# Patient Record
Sex: MALE | Race: WHITE | Employment: OTHER | URBAN - METROPOLITAN AREA
[De-identification: names, ages, dates, MRNs, and addresses within clinical notes are randomized per-mention and may not be internally consistent; named-entity substitution may affect disease eponyms.]

---

## 2019-08-19 ENCOUNTER — HOSPITAL ENCOUNTER (EMERGENCY)
Age: 79
Discharge: HOME | End: 2019-08-19
Payer: MEDICARE

## 2019-08-19 ENCOUNTER — APPOINTMENT (OUTPATIENT)
Dept: CT IMAGING | Age: 79
End: 2019-08-19
Payer: MEDICARE

## 2019-08-19 VITALS
TEMPERATURE: 97 F | SYSTOLIC BLOOD PRESSURE: 137 MMHG | OXYGEN SATURATION: 100 % | HEIGHT: 73 IN | BODY MASS INDEX: 23.52 KG/M2 | HEART RATE: 51 BPM | DIASTOLIC BLOOD PRESSURE: 74 MMHG | WEIGHT: 177.47 LBS | RESPIRATION RATE: 13 BRPM

## 2019-08-19 DIAGNOSIS — R11.2 NON-INTRACTABLE VOMITING WITH NAUSEA, UNSPECIFIED VOMITING TYPE: ICD-10-CM

## 2019-08-19 DIAGNOSIS — R10.13 EPIGASTRIC PAIN: ICD-10-CM

## 2019-08-19 DIAGNOSIS — R19.7 DIARRHEA, UNSPECIFIED TYPE: Primary | ICD-10-CM

## 2019-08-19 LAB
A/G RATIO: 0.8 (ref 1.1–2.2)
ALBUMIN SERPL-MCNC: 3.2 G/DL (ref 3.4–5)
ALP BLD-CCNC: 74 U/L (ref 40–129)
ALT SERPL-CCNC: 42 U/L (ref 10–40)
ANION GAP SERPL CALCULATED.3IONS-SCNC: 13 MMOL/L (ref 3–16)
AST SERPL-CCNC: 39 U/L (ref 15–37)
BASOPHILS ABSOLUTE: 0 K/UL (ref 0–0.2)
BASOPHILS RELATIVE PERCENT: 0.7 %
BILIRUB SERPL-MCNC: 0.3 MG/DL (ref 0–1)
BILIRUBIN URINE: NEGATIVE
BLOOD, URINE: NEGATIVE
BUN BLDV-MCNC: 19 MG/DL (ref 7–20)
CALCIUM SERPL-MCNC: 8.9 MG/DL (ref 8.3–10.6)
CHLORIDE BLD-SCNC: 107 MMOL/L (ref 99–110)
CLARITY: CLEAR
CO2: 21 MMOL/L (ref 21–32)
COLOR: YELLOW
CREAT SERPL-MCNC: 1.4 MG/DL (ref 0.8–1.3)
EKG ATRIAL RATE: 63 BPM
EKG DIAGNOSIS: NORMAL
EKG P AXIS: 12 DEGREES
EKG P-R INTERVAL: 162 MS
EKG Q-T INTERVAL: 462 MS
EKG QRS DURATION: 74 MS
EKG QTC CALCULATION (BAZETT): 472 MS
EKG R AXIS: 33 DEGREES
EKG T AXIS: -10 DEGREES
EKG VENTRICULAR RATE: 63 BPM
EOSINOPHILS ABSOLUTE: 0.1 K/UL (ref 0–0.6)
EOSINOPHILS RELATIVE PERCENT: 1.5 %
GFR AFRICAN AMERICAN: 59
GFR NON-AFRICAN AMERICAN: 49
GLOBULIN: 4.2 G/DL
GLUCOSE BLD-MCNC: 140 MG/DL (ref 70–99)
GLUCOSE URINE: NEGATIVE MG/DL
HCT VFR BLD CALC: 45 % (ref 40.5–52.5)
HEMOGLOBIN: 14.7 G/DL (ref 13.5–17.5)
KETONES, URINE: NEGATIVE MG/DL
LEUKOCYTE ESTERASE, URINE: NEGATIVE
LIPASE: 51 U/L (ref 13–60)
LYMPHOCYTES ABSOLUTE: 1.6 K/UL (ref 1–5.1)
LYMPHOCYTES RELATIVE PERCENT: 24.6 %
MCH RBC QN AUTO: 27.9 PG (ref 26–34)
MCHC RBC AUTO-ENTMCNC: 32.6 G/DL (ref 31–36)
MCV RBC AUTO: 85.4 FL (ref 80–100)
MICROSCOPIC EXAMINATION: NORMAL
MONOCYTES ABSOLUTE: 0.6 K/UL (ref 0–1.3)
MONOCYTES RELATIVE PERCENT: 8.6 %
NEUTROPHILS ABSOLUTE: 4.3 K/UL (ref 1.7–7.7)
NEUTROPHILS RELATIVE PERCENT: 64.6 %
NITRITE, URINE: NEGATIVE
PDW BLD-RTO: 15 % (ref 12.4–15.4)
PH UA: 5 (ref 5–8)
PLATELET # BLD: 384 K/UL (ref 135–450)
PMV BLD AUTO: 8.6 FL (ref 5–10.5)
POTASSIUM REFLEX MAGNESIUM: 4.6 MMOL/L (ref 3.5–5.1)
PROTEIN UA: NEGATIVE MG/DL
RBC # BLD: 5.26 M/UL (ref 4.2–5.9)
SODIUM BLD-SCNC: 141 MMOL/L (ref 136–145)
SPECIFIC GRAVITY UA: 1.02 (ref 1–1.03)
TOTAL PROTEIN: 7.4 G/DL (ref 6.4–8.2)
URINE REFLEX TO CULTURE: NORMAL
URINE TYPE: NORMAL
UROBILINOGEN, URINE: 0.2 E.U./DL
WBC # BLD: 6.6 K/UL (ref 4–11)

## 2019-08-19 PROCEDURE — 6370000000 HC RX 637 (ALT 250 FOR IP): Performed by: PHYSICIAN ASSISTANT

## 2019-08-19 PROCEDURE — 83690 ASSAY OF LIPASE: CPT

## 2019-08-19 PROCEDURE — 99284 EMERGENCY DEPT VISIT MOD MDM: CPT

## 2019-08-19 PROCEDURE — 96361 HYDRATE IV INFUSION ADD-ON: CPT

## 2019-08-19 PROCEDURE — 74176 CT ABD & PELVIS W/O CONTRAST: CPT

## 2019-08-19 PROCEDURE — 2580000003 HC RX 258: Performed by: PHYSICIAN ASSISTANT

## 2019-08-19 PROCEDURE — 93010 ELECTROCARDIOGRAM REPORT: CPT | Performed by: INTERNAL MEDICINE

## 2019-08-19 PROCEDURE — 96374 THER/PROPH/DIAG INJ IV PUSH: CPT

## 2019-08-19 PROCEDURE — 81003 URINALYSIS AUTO W/O SCOPE: CPT

## 2019-08-19 PROCEDURE — 6360000002 HC RX W HCPCS: Performed by: PHYSICIAN ASSISTANT

## 2019-08-19 PROCEDURE — 80053 COMPREHEN METABOLIC PANEL: CPT

## 2019-08-19 PROCEDURE — 85025 COMPLETE CBC W/AUTO DIFF WBC: CPT

## 2019-08-19 PROCEDURE — 93005 ELECTROCARDIOGRAM TRACING: CPT | Performed by: EMERGENCY MEDICINE

## 2019-08-19 RX ORDER — DIPHENOXYLATE HYDROCHLORIDE AND ATROPINE SULFATE 2.5; .025 MG/1; MG/1
1 TABLET ORAL ONCE
Status: COMPLETED | OUTPATIENT
Start: 2019-08-19 | End: 2019-08-19

## 2019-08-19 RX ORDER — ONDANSETRON 4 MG/1
4 TABLET, ORALLY DISINTEGRATING ORAL EVERY 12 HOURS PRN
Qty: 12 TABLET | Refills: 0 | Status: SHIPPED | OUTPATIENT
Start: 2019-08-19 | End: 2020-07-30

## 2019-08-19 RX ORDER — ONDANSETRON 2 MG/ML
4 INJECTION INTRAMUSCULAR; INTRAVENOUS ONCE
Status: COMPLETED | OUTPATIENT
Start: 2019-08-19 | End: 2019-08-19

## 2019-08-19 RX ORDER — DICYCLOMINE HYDROCHLORIDE 10 MG/1
10 CAPSULE ORAL ONCE
Status: COMPLETED | OUTPATIENT
Start: 2019-08-19 | End: 2019-08-19

## 2019-08-19 RX ORDER — 0.9 % SODIUM CHLORIDE 0.9 %
1000 INTRAVENOUS SOLUTION INTRAVENOUS ONCE
Status: COMPLETED | OUTPATIENT
Start: 2019-08-19 | End: 2019-08-19

## 2019-08-19 RX ORDER — DICYCLOMINE HYDROCHLORIDE 10 MG/1
10 CAPSULE ORAL
Qty: 30 CAPSULE | Refills: 0 | Status: SHIPPED | OUTPATIENT
Start: 2019-08-19 | End: 2020-07-30

## 2019-08-19 RX ADMIN — DIPHENOXYLATE HYDROCHLORIDE AND ATROPINE SULFATE 1 TABLET: 2.5; .025 TABLET ORAL at 13:47

## 2019-08-19 RX ADMIN — SODIUM CHLORIDE 1000 ML: 9 INJECTION, SOLUTION INTRAVENOUS at 13:47

## 2019-08-19 RX ADMIN — ONDANSETRON 4 MG: 2 INJECTION INTRAMUSCULAR; INTRAVENOUS at 13:47

## 2019-08-19 RX ADMIN — DICYCLOMINE HYDROCHLORIDE 10 MG: 10 CAPSULE ORAL at 13:48

## 2019-08-19 SDOH — HEALTH STABILITY: MENTAL HEALTH: HOW OFTEN DO YOU HAVE A DRINK CONTAINING ALCOHOL?: NEVER

## 2019-08-19 ASSESSMENT — PAIN DESCRIPTION - PAIN TYPE: TYPE: ACUTE PAIN

## 2019-08-19 ASSESSMENT — PAIN SCALES - GENERAL
PAINLEVEL_OUTOF10: 5
PAINLEVEL_OUTOF10: 0

## 2019-08-19 ASSESSMENT — PAIN DESCRIPTION - DESCRIPTORS: DESCRIPTORS: DISCOMFORT

## 2019-08-19 ASSESSMENT — PAIN DESCRIPTION - LOCATION: LOCATION: ABDOMEN

## 2019-08-19 NOTE — ED PROVIDER NOTES
**EVALUATED BY ADVANCED PRACTICE PROVIDER**        [address] [facility]      Pt Name: Zahira Lee  MBL:[id number]  [place] 1940  Date of evaluation: 8/19/2019  Provider: Mandie Oneal PA-C      Chief Complaint:    Chief Complaint   Patient presents with    Emesis     starting this weekend. Dr Latosha Louie referred pt to ER for possible dehydration. recent convert to SR from AFIB.  Nausea    Diarrhea    Abdominal Pain     \"diseased gullbladder\" needs out but getting afib taken care of first.        Nursing Notes, Past Medical Hx, Past Surgical Hx, Social Hx, Allergies, and Family Hx were all reviewed and agreed with or any disagreements were addressed in the HPI.    HPI:  (Location, Duration, Timing, Severity,Quality, Assoc Sx, Context, Modifying factors)  This is a  [de-identified] y.o. male pain and nausea and vomiting. He states he supposed to get his gallbladder taken out but because of his A. fib they will not do surgery until the correct that. He denies chest pain no weaknesses. Denies fever. No back pain. He does complain of having some diarrhea. No blood in his stool. Says stool is watery. No other complaints. PastMedical/Surgical History:      Diagnosis Date    Atrial fibrillation ([facility]     CAD (coronary artery disease)     Cerebral artery occlusion with cerebral infarction ([facility]     Diabetes mellitus ([facility]     H/O heart artery stent     Hypertension     MI (myocardial infarction) ([facility]      History reviewed. No pertinent surgical history. Medications:  Discharge Medication List as of 8/19/2019  3:22 PM            Review of Systems:  Review of Systems   Constitutional: Negative for chills and fever. HENT: Negative for congestion, facial swelling and sneezing. Eyes: Negative for discharge and redness. Respiratory: Negative for apnea, choking and shortness of breath. Cardiovascular: Negative for chest pain. Gastrointestinal: Positive for abdominal pain, diarrhea, nausea and vomiting. Genitourinary: Negative for dysuria. Musculoskeletal: Negative for back pain, neck pain and neck stiffness. Neurological: Negative for dizziness, tremors, seizures and headaches. All other systems reviewed and are negative. Positives and Pertinent negatives as per HPI. Except as noted above in the ROS, problem specific ROS was completed and is negative. Physical Exam:  Physical Exam   Constitutional: He is oriented to person, place, and time. He appears well-developed and well-nourished. HENT:   Head: Normocephalic and atraumatic. Nose: Nose normal.   Eyes: Right eye exhibits no discharge. Left eye exhibits no discharge. Neck: Normal range of motion. Neck supple. Cardiovascular: Normal rate, regular rhythm and normal heart sounds. Exam reveals no gallop and no friction rub. No murmur heard. Pulmonary/Chest: Effort normal and breath sounds normal. No respiratory distress. He has no wheezes. He has no rales. He exhibits no tenderness. Abdominal: Soft. Bowel sounds are normal. He exhibits no distension and no mass. There is tenderness. There is no rebound and no guarding. Musculoskeletal: Normal range of motion. Neurological: He is alert and oriented to person, place, and time. Skin: Skin is warm and dry. He is not diaphoretic. Psychiatric: He has a normal mood and affect. His behavior is normal.   Nursing note and vitals reviewed.       MEDICAL DECISION MAKING    Vitals:    Vitals:    08/19/19 1339 08/19/19 1401 08/19/19 1432 08/19/19 1439   BP:  129/81 137/74    Pulse: 55 50 (!) 47 51   Resp: 19 20 14 13   Temp:       TempSrc:       SpO2:       Weight:       Height:           LABS:  Labs Reviewed   COMPREHENSIVE METABOLIC PANEL W/ REFLEX TO MG FOR LOW K - Abnormal; Notable for the following components:       Result Value    Glucose 140 (*)     CREATININE 1.4 (*)     GFR Non- 49 (*) List as of 8/19/2019  3:22 PM                 (Please note the MDM and HPI sections of this note were completed with a voice recognition program.  Efforts weremade to edit the dictations but occasionally words are mis-transcribed.)    Electronically signed, Shai Dotson PA-C,          Shai Dotson PA-C  08/25/19 6179

## 2019-08-25 ASSESSMENT — ENCOUNTER SYMPTOMS
BACK PAIN: 0
SHORTNESS OF BREATH: 0
APNEA: 0
VOMITING: 1
EYE REDNESS: 0
FACIAL SWELLING: 0
DIARRHEA: 1
CHOKING: 0
NAUSEA: 1
EYE DISCHARGE: 0
ABDOMINAL PAIN: 1

## 2020-07-30 RX ORDER — AMIODARONE HYDROCHLORIDE 200 MG/1
100 TABLET ORAL NIGHTLY
COMMUNITY
Start: 2019-07-29

## 2020-07-30 RX ORDER — METOPROLOL SUCCINATE 100 MG/1
50 TABLET, EXTENDED RELEASE ORAL DAILY
COMMUNITY
Start: 2019-07-24

## 2020-07-30 RX ORDER — ISOSORBIDE MONONITRATE 30 MG/1
15 TABLET, EXTENDED RELEASE ORAL DAILY
COMMUNITY
Start: 2018-12-21

## 2020-07-30 RX ORDER — ASPIRIN 81 MG/1
81 TABLET, CHEWABLE ORAL DAILY
COMMUNITY

## 2020-07-30 RX ORDER — ATORVASTATIN CALCIUM 40 MG/1
40 TABLET, FILM COATED ORAL NIGHTLY
COMMUNITY

## 2020-07-30 NOTE — PROGRESS NOTES
[address] time____1000________        Surgery time__1100__________    Take the following medications with a sip of water:metoprolol,amiodaraone, imdur    Do not eat or drink anything after 12:00 midnight prior to your surgery. except prep  This includes water chewing gum, mints and ice chips. You may brush your teeth and gargle the morning of your surgery, but do not swallow the water     Please see your family doctor/pediatrician for a history and physical and/or concerning medications. Bring any test results/reports from your physicians office. If you are under the care of a heart doctor or specialist doctor, please be aware that you may be asked to them for clearance    You may be asked to stop blood thinners such as Coumadin, Plavix, Fragmin, Lovenox, etc., or any anti-inflammatories such as:  Aspirin, Ibuprofen, Advil, Naproxen prior to your surgery. We also ask that you stop any OTC medications such as fish oil, vitamin E, glucosamine, garlic, Multivitamins, COQ 10, etc.    We ask that you do not smoke 24 hours prior to surgery  We ask that you do not  drink any alcoholic beverages 24 hours prior to surgery     You must make arrangements for a responsible adult to take you home after your surgery. For your safety you will not be allowed to leave alone or drive yourself home. Your surgery will be cancelled if you do not have a ride home. Also for your safety, it is strongly suggested that someone stay with you the first 24 hours after your surgery. A parent or legal guardian must accompany a child scheduled for surgery and plan to stay at the hospital until the child is discharged. Please do not bring other children with you. For your comfort, please wear simple loose fitting clothing to the hospital.  Please do not bring valuables.     Do not wear any make-up or nail polish on your fingers or toes      For your safety, please do not wear any jewelry or body piercing's on the day of surgery. All jewelry must be removed. If you have dentures, they will be removed before going to operating room. For your convenience, we will provide you with a container. If you wear contact lenses or glasses, they will be removed, please bring a case for them. If you have a living will and a durable power of  for healthcare, please bring in a copy. As part of our patient safety program to minimize surgical site infections, we ask you to do the following:    · Please notify your surgeon if you develop any illness between         now and the  day of your surgery. · This includes a cough, cold, fever, sore throat, nausea,         or vomiting, and diarrhea, etc.  ·  Please notify your surgeon if you experience dizziness, shortness         of breath or blurred vision between now and the time of your surgery. You may shower the night before surgery or the morning of   your surgery with an antibacterial soap. You will need to bring a photo ID and insurance card    [facility] has an onsite pharmacy, would you like to utilize our pharmacy     If you will be staying overnight and use a C-pap machine, please bring   your C-pap to hospital     Our goal is to provide you with excellent care, therefore, visitors will be limited to two(2) in the room at a time so that we may focus on providing this care for you. Please contact pre-admission testing if you have any further questions. [facility] phone number:  [phone]  Please note these are generalized instructions for all surgical cases, you may be provided with more specific instructions according to your surgery.

## 2020-08-03 ENCOUNTER — OFFICE VISIT (OUTPATIENT)
Dept: PRIMARY CARE CLINIC | Age: 80
End: 2020-08-03

## 2020-08-03 NOTE — PROGRESS NOTES
Patient presented to [facility] drive up clinic for preop testing. Patient was swabbed and given information advising them to remain isolated until procedure date.

## 2020-08-04 LAB — SARS-COV-2, NAA: NOT DETECTED

## 2020-08-06 ENCOUNTER — ANESTHESIA EVENT (OUTPATIENT)
Dept: ENDOSCOPY | Age: 80
End: 2020-08-06
Payer: MEDICARE

## 2020-08-07 ENCOUNTER — ANESTHESIA (OUTPATIENT)
Dept: ENDOSCOPY | Age: 80
End: 2020-08-07
Payer: MEDICARE

## 2020-08-07 ENCOUNTER — HOSPITAL ENCOUNTER (OUTPATIENT)
Age: 80
Setting detail: OUTPATIENT SURGERY
Discharge: HOME | End: 2020-08-07
Attending: INTERNAL MEDICINE | Admitting: INTERNAL MEDICINE
Payer: MEDICARE

## 2020-08-07 VITALS
TEMPERATURE: 97 F | RESPIRATION RATE: 16 BRPM | BODY MASS INDEX: 26.36 KG/M2 | SYSTOLIC BLOOD PRESSURE: 122 MMHG | OXYGEN SATURATION: 97 % | HEIGHT: 72 IN | HEART RATE: 54 BPM | DIASTOLIC BLOOD PRESSURE: 69 MMHG | WEIGHT: 194.6 LBS

## 2020-08-07 VITALS — OXYGEN SATURATION: 96 % | DIASTOLIC BLOOD PRESSURE: 74 MMHG | SYSTOLIC BLOOD PRESSURE: 117 MMHG

## 2020-08-07 LAB
GLUCOSE BLD-MCNC: 118 MG/DL (ref 70–99)
PERFORMED ON: ABNORMAL

## 2020-08-07 PROCEDURE — 2709999900 HC NON-CHARGEABLE SUPPLY: Performed by: INTERNAL MEDICINE

## 2020-08-07 PROCEDURE — 7100000011 HC PHASE II RECOVERY - ADDTL 15 MIN: Performed by: INTERNAL MEDICINE

## 2020-08-07 PROCEDURE — 7100000010 HC PHASE II RECOVERY - FIRST 15 MIN: Performed by: INTERNAL MEDICINE

## 2020-08-07 PROCEDURE — 6360000002 HC RX W HCPCS: Performed by: NURSE ANESTHETIST, CERTIFIED REGISTERED

## 2020-08-07 PROCEDURE — 2580000003 HC RX 258: Performed by: ANESTHESIOLOGY

## 2020-08-07 PROCEDURE — 3700000000 HC ANESTHESIA ATTENDED CARE: Performed by: INTERNAL MEDICINE

## 2020-08-07 PROCEDURE — 88305 TISSUE EXAM BY PATHOLOGIST: CPT

## 2020-08-07 PROCEDURE — 3609010600 HC COLONOSCOPY POLYPECTOMY SNARE/COLD BIOPSY: Performed by: INTERNAL MEDICINE

## 2020-08-07 PROCEDURE — 2500000003 HC RX 250 WO HCPCS: Performed by: NURSE ANESTHETIST, CERTIFIED REGISTERED

## 2020-08-07 PROCEDURE — 3700000001 HC ADD 15 MINUTES (ANESTHESIA): Performed by: INTERNAL MEDICINE

## 2020-08-07 RX ORDER — SODIUM CHLORIDE 0.9 % (FLUSH) 0.9 %
10 SYRINGE (ML) INJECTION EVERY 12 HOURS SCHEDULED
Status: DISCONTINUED | OUTPATIENT
Start: 2020-08-07 | End: 2020-08-07 | Stop reason: HOSPADM

## 2020-08-07 RX ORDER — SODIUM CHLORIDE 0.9 % (FLUSH) 0.9 %
10 SYRINGE (ML) INJECTION PRN
Status: DISCONTINUED | OUTPATIENT
Start: 2020-08-07 | End: 2020-08-07 | Stop reason: HOSPADM

## 2020-08-07 RX ORDER — SODIUM CHLORIDE 9 MG/ML
INJECTION, SOLUTION INTRAVENOUS CONTINUOUS
Status: DISCONTINUED | OUTPATIENT
Start: 2020-08-07 | End: 2020-08-07 | Stop reason: HOSPADM

## 2020-08-07 RX ORDER — PROPOFOL 10 MG/ML
INJECTION, EMULSION INTRAVENOUS CONTINUOUS PRN
Status: DISCONTINUED | OUTPATIENT
Start: 2020-08-07 | End: 2020-08-07 | Stop reason: SDUPTHER

## 2020-08-07 RX ORDER — PROPOFOL 10 MG/ML
INJECTION, EMULSION INTRAVENOUS PRN
Status: DISCONTINUED | OUTPATIENT
Start: 2020-08-07 | End: 2020-08-07 | Stop reason: SDUPTHER

## 2020-08-07 RX ORDER — ONDANSETRON 2 MG/ML
4 INJECTION INTRAMUSCULAR; INTRAVENOUS
Status: DISCONTINUED | OUTPATIENT
Start: 2020-08-07 | End: 2020-08-07 | Stop reason: HOSPADM

## 2020-08-07 RX ORDER — LIDOCAINE HYDROCHLORIDE 20 MG/ML
INJECTION, SOLUTION EPIDURAL; INFILTRATION; INTRACAUDAL; PERINEURAL PRN
Status: DISCONTINUED | OUTPATIENT
Start: 2020-08-07 | End: 2020-08-07 | Stop reason: SDUPTHER

## 2020-08-07 RX ADMIN — SODIUM CHLORIDE: 9 INJECTION, SOLUTION INTRAVENOUS at 10:27

## 2020-08-07 RX ADMIN — PROPOFOL 180 MCG/KG/MIN: 10 INJECTION, EMULSION INTRAVENOUS at 11:17

## 2020-08-07 RX ADMIN — LIDOCAINE HYDROCHLORIDE 3 ML: 20 INJECTION, SOLUTION EPIDURAL; INFILTRATION; INTRACAUDAL; PERINEURAL at 11:17

## 2020-08-07 RX ADMIN — PROPOFOL 80 MG: 10 INJECTION, EMULSION INTRAVENOUS at 11:17

## 2020-08-07 ASSESSMENT — PAIN SCALES - WONG BAKER
WONGBAKER_NUMERICALRESPONSE: 0

## 2020-08-07 ASSESSMENT — PAIN SCALES - GENERAL
PAINLEVEL_OUTOF10: 0

## 2020-08-07 ASSESSMENT — PAIN - FUNCTIONAL ASSESSMENT: PAIN_FUNCTIONAL_ASSESSMENT: 0-10

## 2020-08-07 ASSESSMENT — ENCOUNTER SYMPTOMS: SHORTNESS OF BREATH: 0

## 2020-08-07 ASSESSMENT — LIFESTYLE VARIABLES: SMOKING_STATUS: 0

## 2020-08-07 NOTE — PROCEDURES
[facility]  Endoscopy Note    Patient: Osmany Hill  : 1940  Acct#: [de-identified]    Procedure: Colonoscopy with polypectomy (snare cautery)    Date:  2020    Surgeon:  Denice Shipman MD    Referring Physician:  Jenn Julian    Previous Colonoscopy: Yes  Date: unknown  Greater than 3 years? Yes    Preoperative Diagnosis:  surveillance    Postoperative Diagnosis:  Colon polyp    Anesthesia:  See anesthesia note    Indications: This is a 78y.o. year old male who presents today with previous adenomatous polyp. Procedure: An informed consent was obtained from the patient after explanation of indications, benefits, possible risks and complications of the procedure. The patient was then taken to the endoscopy suite, placed in the left lateral decubitus position, and the above IV anesthesia was administered. A digital rectal examination was performed and revealed negative without mass, lesions or tenderness. The Olympus CFQ-180-AL video colonoscope was placed in the patient's rectum under digital direction and advanced to the cecum. The cecum was identified by characteristic anatomy and ballottment. The ileocecal valve was identified. The preparation was excellent. The scope was then withdrawn back through the cecum, ascending, transverse, descending and sigmoid colons. Carefull circumferential examination of the mucosa in these areas demonstrated a 5 mm polyp in the ascending colon that was snared and removed. There was a tattoo in the rectum at the previous polypectomy site. The scope was then withdrawn into the rectum and retroflexed. The retroflexed view of the anal verge and rectum demonstrates no abnormalities. The scope was straightened, the colon was decompressed and the scope was withdrawn from the patient. The patient tolerated the procedure well and was taken to the PACU in good condition.     Estimated Blood Loss:  none    Impression: Colon polyp    Recommendations:

## 2020-08-07 NOTE — H&P
[facility]   Pre-operative History and Physical    Patient: Percy Soni  : 1940  Acct#: [de-identified]    History Obtained From: electronic medical record    HISTORY OF PRESENT ILLNESS  Procedure:Colonoscopy  Indications:surveillance  Past Medical History:        Diagnosis Date    [facility] ([facility]     CAD (coronary artery disease)     Cerebral artery occlusion with cerebral infarction ([facility]     Diabetes mellitus ([facility]     H/O heart artery stent     Hypertension     MI (myocardial infarction) ([facility]      Past Surgical History:        Procedure Laterality Date    CARDIAC SURGERY      COLONOSCOPY       Medications prior to admission:   Prior to Admission medications    Medication Sig Start Date End Date Taking?  Authorizing Provider   amiodarone (CORDARONE) 200 MG tablet Take 200 mg by mouth 19  Yes Historical Provider, MD   apixaban (ELIQUIS) 5 MG TABS tablet Take 5 mg by mouth 19  Yes Historical Provider, MD   aspirin 81 MG chewable tablet 81 mg   Yes Historical Provider, MD   atorvastatin (LIPITOR) 40 MG tablet Take 40 mg by mouth   Yes Historical Provider, MD   isosorbide mononitrate (IMDUR) 30 MG extended release tablet TAKE ONE-HALF TABLET BY MOUTH DAILY 18  Yes Historical Provider, MD   metFORMIN (GLUCOPHAGE) 500 MG tablet Take 500 mg by mouth 12/11/15  Yes Historical Provider, MD   metoprolol succinate (TOPROL XL) 100 MG extended release tablet Take 100 mg by mouth 19  Yes Historical Provider, MD     Allergies:   Diphenhydramine    Social History     Socioeconomic History    Marital status:      Spouse name: Not on file    Number of children: Not on file    Years of education: Not on file    Highest education level: Not on file   Occupational History    Not on file   Social Needs    Financial resource strain: Not on file    Food insecurity     Worry: Not on file     Inability: Not on file    Transportation needs     Medical: Not on file

## 2020-08-07 NOTE — ANESTHESIA POSTPROCEDURE EVALUATION
Department of Anesthesiology  Postprocedure Note    Patient: Pete Green  MRN: [id number]  YOB: 1940  Date of evaluation: 8/7/2020  Time:  11:57 AM     Procedure Summary     Date:  08/07/20 Room / Location:  [address] [place] [address]    Anesthesia Start:  1112 Anesthesia Stop:  1470    Procedure:  COLONOSCOPY POLYPECTOMY SNARE/COLD BIOPSY (N/A ) Diagnosis:       History of colon polyps      (HISTORY OF POLYPS)    Surgeon:  Maira Castro MD Responsible Provider:  Lino Logan MD    Anesthesia Type:  MAC ASA Status:  3          Anesthesia Type: MAC    Alejandrina Phase I: Alejandrina Score: 10    Alejandrina Phase II: Alejandrina Score: 10    Last vitals: Reviewed and per EMR flowsheets.        Anesthesia Post Evaluation    Patient location during evaluation: PACU  Patient participation: complete - patient participated  Level of consciousness: awake and alert  Airway patency: patent  Nausea & Vomiting: no nausea and no vomiting  Complications: no  Cardiovascular status: hemodynamically stable  Respiratory status: acceptable  Hydration status: stable

## 2020-08-07 NOTE — ANESTHESIA PRE PROCEDURE
Department of Anesthesiology  Preprocedure Note       Name:  Venecia Sim   Age:  78 y.o.  :  1940                                          MRN:  [id number]         Date:  2020      Surgeon: Flor Dill):  Conrado Infante MD    Procedure: Procedure(s):  COLONOSCOPY DIAGNOSTIC    Medications prior to admission:   Prior to Admission medications    Medication Sig Start Date End Date Taking? Authorizing Provider   amiodarone (CORDARONE) 200 MG tablet Take 200 mg by mouth 19  Yes Historical Provider, MD   apixaban (ELIQUIS) 5 MG TABS tablet Take 5 mg by mouth 19  Yes Historical Provider, MD   aspirin 81 MG chewable tablet 81 mg   Yes Historical Provider, MD   atorvastatin (LIPITOR) 40 MG tablet Take 40 mg by mouth   Yes Historical Provider, MD   isosorbide mononitrate (IMDUR) 30 MG extended release tablet TAKE ONE-HALF TABLET BY MOUTH DAILY 18  Yes Historical Provider, MD   metFORMIN (GLUCOPHAGE) 500 MG tablet Take 500 mg by mouth 12/11/15  Yes Historical Provider, MD   metoprolol succinate (TOPROL XL) 100 MG extended release tablet Take 100 mg by mouth 19  Yes Historical Provider, MD       Current medications:    Current Facility-Administered Medications   Medication Dose Route Frequency Provider Last Rate Last Dose    0.9 % sodium chloride infusion   Intravenous Continuous Lien Linda  mL/hr at 20 1027      sodium chloride flush 0.9 % injection 10 mL  10 mL Intravenous 2 times per day Lien Linda MD        sodium chloride flush 0.9 % injection 10 mL  10 mL Intravenous PRN Lien Linda MD           Allergies: Allergies   Allergen Reactions    Diphenhydramine Palpitations       Problem List:  There is no problem list on file for this patient.       Past Medical History:        Diagnosis Date    Atrial fibrillation ([address]     CAD (coronary artery disease)     [facility]     Diabetes mellitus ([facility]     H/O heart artery stent     Hypertension     MI (myocardial infarction) ([facility]        Past Surgical History:        Procedure Laterality Date    CARDIAC SURGERY      COLONOSCOPY         Social History:    Social History     Tobacco Use    Smoking status: Never Smoker    Smokeless tobacco: Never Used   Substance Use Topics    Alcohol use: Never     Frequency: Never                                Counseling given: Not Answered      Vital Signs (Current):   Vitals:    07/30/20 1406 08/07/20 1024   BP:  (!) 167/91   Pulse:  69   Resp:  16   Temp:  96.4 °F (35.8 °C)   TempSrc:  Temporal   SpO2:  98%   Weight: 190 lb (86.2 kg) 194 lb 9.6 oz (88.3 kg)   Height: 6' (1.829 m) 6' (1.829 m)                                              BP Readings from Last 3 Encounters:   08/07/20 (!) 167/91   08/19/19 137/74       NPO Status: Time of last liquid consumption: 0800                        Time of last solid consumption: 1900                        Date of last liquid consumption: 08/07/20                        Date of last solid food consumption: 08/05/20    BMI:   Wt Readings from Last 3 Encounters:   08/07/20 194 lb 9.6 oz (88.3 kg)   08/19/19 177 lb 7.5 oz (80.5 kg)     Body mass index is 26.39 kg/m².     CBC:   Lab Results   Component Value Date    WBC 6.6 08/19/2019    RBC 5.26 08/19/2019    HGB 14.7 08/19/2019    HCT 45.0 08/19/2019    MCV 85.4 08/19/2019    RDW 15.0 08/19/2019     08/19/2019       CMP:   Lab Results   Component Value Date     08/19/2019    K 4.6 08/19/2019     08/19/2019    CO2 21 08/19/2019    BUN 19 08/19/2019    CREATININE 1.4 08/19/2019    GFRAA 59 08/19/2019    GFRAA >60 03/06/2012    AGRATIO 0.8 08/19/2019    LABGLOM 49 08/19/2019    GLUCOSE 140 08/19/2019    PROT 7.4 08/19/2019    PROT 7.9 03/06/2012    CALCIUM 8.9 08/19/2019    BILITOT 0.3 08/19/2019    ALKPHOS 74 08/19/2019    AST 39 08/19/2019    ALT 42 08/19/2019       POC Tests: No results for input(s): POCGLU, POCNA, POCK, POCCL, Ene Ruiz, POCHCT in the last 72 hours. Coags: No results found for: PROTIME, INR, APTT    HCG (If Applicable): No results found for: PREGTESTUR, PREGSERUM, HCG, HCGQUANT     ABGs: No results found for: PHART, PO2ART, EBX9HUF, EHS3NAN, BEART, U7EPDZVN     Type & Screen (If Applicable):  No results found for: LABABO, LABRH    Drug/Infectious Status (If Applicable):  No results found for: HIV, HEPCAB    COVID-19 Screening (If Applicable):   Lab Results   Component Value Date    COVID19 NOT DETECTED 08/03/2020         Anesthesia Evaluation  Patient summary reviewed no history of anesthetic complications:   Airway: Mallampati: II  TM distance: >3 FB   Neck ROM: full  Mouth opening: > = 3 FB Dental:      Comment: Missing tooth    Pulmonary: breath sounds clear to auscultation      (-) COPD, asthma, shortness of breath, recent URI, sleep apnea and not a current smoker                           Cardiovascular:    (+) hypertension:, past MI:, CAD:, CABG/stent (stents):, dysrhythmias: atrial fibrillation, hyperlipidemia        Rhythm: regular  Rate: normal                    Neuro/Psych:   (+) CVA:,    (-) seizures, neuromuscular disease and TIA           GI/Hepatic/Renal:   (+) GERD: well controlled, bowel prep,      (-) PUD, hepatitis and liver disease       Endo/Other:    (+) DiabetesType II DM, , blood dyscrasia::., .    (-) hypothyroidism               Abdominal:           Vascular:                                      Anesthesia Plan      MAC     ASA 3       Induction: intravenous. Anesthetic plan and risks discussed with patient. Plan discussed with CRNA. This pre-anesthesia assessment may be used as a history and physical.    DOS STAFF ADDENDUM:    Pt seen and examined, chart reviewed (including anesthesia, drug and allergy history). No interval changes to history and physical examination. Anesthetic plan, risks, benefits, alternatives, and personnel involved discussed with patient. Patient verbalized an understanding and agrees to proceed.       Miranda Bonner MD  August 7, 2020  10:29 AM      Miranda Bonner MD   8/7/2020

## 2020-08-07 NOTE — PROGRESS NOTES
Discharge instructions discussed. Patient and responsible adult verbalized understanding of discharge instructions, sedation medication, and potential complications including pain. Patient instructed to call Doctor if complications occur.

## 2021-09-09 ENCOUNTER — HOSPITAL ENCOUNTER (INPATIENT)
Age: 81
LOS: 3 days | Discharge: HOME | DRG: 315 | End: 2021-09-12
Attending: EMERGENCY MEDICINE | Admitting: INTERNAL MEDICINE
Payer: MEDICARE

## 2021-09-09 ENCOUNTER — APPOINTMENT (OUTPATIENT)
Dept: CT IMAGING | Age: 81
DRG: 315 | End: 2021-09-09
Payer: MEDICARE

## 2021-09-09 ENCOUNTER — APPOINTMENT (OUTPATIENT)
Dept: GENERAL RADIOLOGY | Age: 81
DRG: 315 | End: 2021-09-09
Payer: MEDICARE

## 2021-09-09 DIAGNOSIS — Z23 NEED FOR TETANUS BOOSTER: ICD-10-CM

## 2021-09-09 DIAGNOSIS — S61.412A SKIN TEAR OF LEFT HAND WITHOUT COMPLICATION, INITIAL ENCOUNTER: ICD-10-CM

## 2021-09-09 DIAGNOSIS — R55 SYNCOPE AND COLLAPSE: Primary | ICD-10-CM

## 2021-09-09 DIAGNOSIS — S01.81XA FOREHEAD LACERATION, INITIAL ENCOUNTER: ICD-10-CM

## 2021-09-09 PROBLEM — E11.9 DMII (DIABETES MELLITUS, TYPE 2) (HCC): Status: ACTIVE | Noted: 2021-09-09

## 2021-09-09 PROBLEM — E78.5 HYPERLIPIDEMIA: Status: ACTIVE | Noted: 2021-09-09

## 2021-09-09 PROBLEM — I10 ESSENTIAL HYPERTENSION: Status: ACTIVE | Noted: 2021-09-09

## 2021-09-09 LAB
ANION GAP SERPL CALCULATED.3IONS-SCNC: 11 MMOL/L (ref 3–16)
BASOPHILS ABSOLUTE: 0.1 K/UL (ref 0–0.2)
BASOPHILS RELATIVE PERCENT: 1 %
BILIRUBIN URINE: NEGATIVE
BLOOD, URINE: NEGATIVE
BUN BLDV-MCNC: 16 MG/DL (ref 7–20)
CALCIUM SERPL-MCNC: 9 MG/DL (ref 8.3–10.6)
CHLORIDE BLD-SCNC: 102 MMOL/L (ref 99–110)
CLARITY: CLEAR
CO2: 22 MMOL/L (ref 21–32)
COLOR: YELLOW
CREAT SERPL-MCNC: 1.5 MG/DL (ref 0.8–1.3)
EKG ATRIAL RATE: 56 BPM
EKG DIAGNOSIS: NORMAL
EKG P AXIS: 42 DEGREES
EKG P-R INTERVAL: 164 MS
EKG Q-T INTERVAL: 470 MS
EKG QRS DURATION: 76 MS
EKG QTC CALCULATION (BAZETT): 453 MS
EKG R AXIS: 28 DEGREES
EKG T AXIS: 47 DEGREES
EKG VENTRICULAR RATE: 56 BPM
EOSINOPHILS ABSOLUTE: 0.1 K/UL (ref 0–0.6)
EOSINOPHILS RELATIVE PERCENT: 0.8 %
GFR AFRICAN AMERICAN: 54
GFR NON-AFRICAN AMERICAN: 45
GLUCOSE BLD-MCNC: 168 MG/DL (ref 70–99)
GLUCOSE BLD-MCNC: 99 MG/DL (ref 70–99)
GLUCOSE URINE: 250 MG/DL
HCT VFR BLD CALC: 41.8 % (ref 40.5–52.5)
HEMOGLOBIN: 14.2 G/DL (ref 13.5–17.5)
KETONES, URINE: NEGATIVE MG/DL
LACTIC ACID: 2.2 MMOL/L (ref 0.4–2)
LEUKOCYTE ESTERASE, URINE: NEGATIVE
LYMPHOCYTES ABSOLUTE: 1.4 K/UL (ref 1–5.1)
LYMPHOCYTES RELATIVE PERCENT: 17.3 %
MCH RBC QN AUTO: 31 PG (ref 26–34)
MCHC RBC AUTO-ENTMCNC: 34 G/DL (ref 31–36)
MCV RBC AUTO: 91.1 FL (ref 80–100)
MICROSCOPIC EXAMINATION: ABNORMAL
MONOCYTES ABSOLUTE: 1.1 K/UL (ref 0–1.3)
MONOCYTES RELATIVE PERCENT: 13.7 %
NEUTROPHILS ABSOLUTE: 5.5 K/UL (ref 1.7–7.7)
NEUTROPHILS RELATIVE PERCENT: 67.2 %
NITRITE, URINE: NEGATIVE
PDW BLD-RTO: 14.1 % (ref 12.4–15.4)
PERFORMED ON: NORMAL
PH UA: 6 (ref 5–8)
PLATELET # BLD: 198 K/UL (ref 135–450)
PMV BLD AUTO: 9.6 FL (ref 5–10.5)
POTASSIUM REFLEX MAGNESIUM: 4.3 MMOL/L (ref 3.5–5.1)
PROTEIN UA: NEGATIVE MG/DL
RBC # BLD: 4.59 M/UL (ref 4.2–5.9)
SODIUM BLD-SCNC: 135 MMOL/L (ref 136–145)
SPECIFIC GRAVITY UA: 1.02 (ref 1–1.03)
TROPONIN: <0.01 NG/ML
TSH REFLEX: 2.74 UIU/ML (ref 0.27–4.2)
URINE REFLEX TO CULTURE: ABNORMAL
URINE TYPE: ABNORMAL
UROBILINOGEN, URINE: 0.2 E.U./DL
WBC # BLD: 8.2 K/UL (ref 4–11)

## 2021-09-09 PROCEDURE — 2580000003 HC RX 258: Performed by: INTERNAL MEDICINE

## 2021-09-09 PROCEDURE — 90471 IMMUNIZATION ADMIN: CPT | Performed by: NURSE PRACTITIONER

## 2021-09-09 PROCEDURE — 6360000002 HC RX W HCPCS: Performed by: NURSE PRACTITIONER

## 2021-09-09 PROCEDURE — 72125 CT NECK SPINE W/O DYE: CPT

## 2021-09-09 PROCEDURE — 81003 URINALYSIS AUTO W/O SCOPE: CPT

## 2021-09-09 PROCEDURE — 94640 AIRWAY INHALATION TREATMENT: CPT

## 2021-09-09 PROCEDURE — U0005 INFEC AGEN DETEC AMPLI PROBE: HCPCS

## 2021-09-09 PROCEDURE — U0003 INFECTIOUS AGENT DETECTION BY NUCLEIC ACID (DNA OR RNA); SEVERE ACUTE RESPIRATORY SYNDROME CORONAVIRUS 2 (SARS-COV-2) (CORONAVIRUS DISEASE [COVID-19]), AMPLIFIED PROBE TECHNIQUE, MAKING USE OF HIGH THROUGHPUT TECHNOLOGIES AS DESCRIBED BY CMS-2020-01-R: HCPCS

## 2021-09-09 PROCEDURE — 6370000000 HC RX 637 (ALT 250 FOR IP): Performed by: NURSE PRACTITIONER

## 2021-09-09 PROCEDURE — 93005 ELECTROCARDIOGRAM TRACING: CPT | Performed by: EMERGENCY MEDICINE

## 2021-09-09 PROCEDURE — 90715 TDAP VACCINE 7 YRS/> IM: CPT | Performed by: NURSE PRACTITIONER

## 2021-09-09 PROCEDURE — 94761 N-INVAS EAR/PLS OXIMETRY MLT: CPT

## 2021-09-09 PROCEDURE — 2060000000 HC ICU INTERMEDIATE R&B

## 2021-09-09 PROCEDURE — 70450 CT HEAD/BRAIN W/O DYE: CPT

## 2021-09-09 PROCEDURE — 6370000000 HC RX 637 (ALT 250 FOR IP): Performed by: INTERNAL MEDICINE

## 2021-09-09 PROCEDURE — 93010 ELECTROCARDIOGRAM REPORT: CPT | Performed by: INTERNAL MEDICINE

## 2021-09-09 PROCEDURE — 99285 EMERGENCY DEPT VISIT HI MDM: CPT

## 2021-09-09 PROCEDURE — 80048 BASIC METABOLIC PNL TOTAL CA: CPT

## 2021-09-09 PROCEDURE — 83605 ASSAY OF LACTIC ACID: CPT

## 2021-09-09 PROCEDURE — 84443 ASSAY THYROID STIM HORMONE: CPT

## 2021-09-09 PROCEDURE — 71045 X-RAY EXAM CHEST 1 VIEW: CPT

## 2021-09-09 PROCEDURE — 84484 ASSAY OF TROPONIN QUANT: CPT

## 2021-09-09 PROCEDURE — 85025 COMPLETE CBC W/AUTO DIFF WBC: CPT

## 2021-09-09 PROCEDURE — 12011 RPR F/E/E/N/L/M 2.5 CM/<: CPT

## 2021-09-09 PROCEDURE — 93880 EXTRACRANIAL BILAT STUDY: CPT

## 2021-09-09 RX ORDER — NIFEDIPINE 30 MG/1
30 TABLET, EXTENDED RELEASE ORAL DAILY
COMMUNITY
Start: 2021-03-11

## 2021-09-09 RX ORDER — DEXTROSE MONOHYDRATE 25 G/50ML
12.5 INJECTION, SOLUTION INTRAVENOUS PRN
Status: DISCONTINUED | OUTPATIENT
Start: 2021-09-09 | End: 2021-09-12 | Stop reason: HOSPADM

## 2021-09-09 RX ORDER — ONDANSETRON 2 MG/ML
4 INJECTION INTRAMUSCULAR; INTRAVENOUS EVERY 4 HOURS PRN
Status: DISCONTINUED | OUTPATIENT
Start: 2021-09-09 | End: 2021-09-12 | Stop reason: HOSPADM

## 2021-09-09 RX ORDER — ISOSORBIDE MONONITRATE 30 MG/1
30 TABLET, EXTENDED RELEASE ORAL DAILY
Status: DISCONTINUED | OUTPATIENT
Start: 2021-09-10 | End: 2021-09-12 | Stop reason: HOSPADM

## 2021-09-09 RX ORDER — IPRATROPIUM BROMIDE AND ALBUTEROL SULFATE 2.5; .5 MG/3ML; MG/3ML
2 SOLUTION RESPIRATORY (INHALATION) ONCE
Status: COMPLETED | OUTPATIENT
Start: 2021-09-09 | End: 2021-09-09

## 2021-09-09 RX ORDER — ALBUTEROL SULFATE 90 UG/1
2 AEROSOL, METERED RESPIRATORY (INHALATION) 4 TIMES DAILY
Status: DISCONTINUED | OUTPATIENT
Start: 2021-09-09 | End: 2021-09-09

## 2021-09-09 RX ORDER — METOPROLOL SUCCINATE 50 MG/1
50 TABLET, EXTENDED RELEASE ORAL DAILY
Status: DISCONTINUED | OUTPATIENT
Start: 2021-09-10 | End: 2021-09-12 | Stop reason: HOSPADM

## 2021-09-09 RX ORDER — ACETAMINOPHEN 650 MG/1
650 SUPPOSITORY RECTAL EVERY 4 HOURS PRN
Status: DISCONTINUED | OUTPATIENT
Start: 2021-09-09 | End: 2021-09-12 | Stop reason: HOSPADM

## 2021-09-09 RX ORDER — ACETAMINOPHEN 500 MG
1000 TABLET ORAL ONCE
Status: COMPLETED | OUTPATIENT
Start: 2021-09-09 | End: 2021-09-09

## 2021-09-09 RX ORDER — ATORVASTATIN CALCIUM 40 MG/1
40 TABLET, FILM COATED ORAL NIGHTLY
Status: DISCONTINUED | OUTPATIENT
Start: 2021-09-09 | End: 2021-09-12 | Stop reason: HOSPADM

## 2021-09-09 RX ORDER — BACITRACIN, NEOMYCIN, POLYMYXIN B 400; 3.5; 5 [USP'U]/G; MG/G; [USP'U]/G
OINTMENT TOPICAL ONCE
Status: COMPLETED | OUTPATIENT
Start: 2021-09-09 | End: 2021-09-09

## 2021-09-09 RX ORDER — FLUTICASONE PROPIONATE 50 MCG
1 SPRAY, SUSPENSION (ML) NASAL NIGHTLY PRN
COMMUNITY

## 2021-09-09 RX ORDER — AMIODARONE HYDROCHLORIDE 200 MG/1
TABLET ORAL
Status: DISPENSED
Start: 2021-09-09 | End: 2021-09-10

## 2021-09-09 RX ORDER — DEXTROSE MONOHYDRATE 50 MG/ML
100 INJECTION, SOLUTION INTRAVENOUS PRN
Status: DISCONTINUED | OUTPATIENT
Start: 2021-09-09 | End: 2021-09-12 | Stop reason: HOSPADM

## 2021-09-09 RX ORDER — ALBUTEROL SULFATE 90 UG/1
2 AEROSOL, METERED RESPIRATORY (INHALATION)
Status: DISCONTINUED | OUTPATIENT
Start: 2021-09-09 | End: 2021-09-12 | Stop reason: HOSPADM

## 2021-09-09 RX ORDER — DEXAMETHASONE SODIUM PHOSPHATE 10 MG/ML
INJECTION, SOLUTION INTRAMUSCULAR; INTRAVENOUS
Status: DISPENSED
Start: 2021-09-09 | End: 2021-09-10

## 2021-09-09 RX ORDER — SODIUM CHLORIDE 9 MG/ML
25 INJECTION, SOLUTION INTRAVENOUS PRN
Status: DISCONTINUED | OUTPATIENT
Start: 2021-09-09 | End: 2021-09-12 | Stop reason: HOSPADM

## 2021-09-09 RX ORDER — SODIUM CHLORIDE 0.9 % (FLUSH) 0.9 %
10 SYRINGE (ML) INJECTION EVERY 12 HOURS SCHEDULED
Status: DISCONTINUED | OUTPATIENT
Start: 2021-09-09 | End: 2021-09-12 | Stop reason: HOSPADM

## 2021-09-09 RX ORDER — SODIUM CHLORIDE 0.9 % (FLUSH) 0.9 %
10 SYRINGE (ML) INJECTION PRN
Status: DISCONTINUED | OUTPATIENT
Start: 2021-09-09 | End: 2021-09-12 | Stop reason: HOSPADM

## 2021-09-09 RX ORDER — ALBUTEROL SULFATE 90 UG/1
2 AEROSOL, METERED RESPIRATORY (INHALATION) 2 TIMES DAILY
Status: DISCONTINUED | OUTPATIENT
Start: 2021-09-10 | End: 2021-09-11

## 2021-09-09 RX ORDER — METOPROLOL SUCCINATE 50 MG/1
100 TABLET, EXTENDED RELEASE ORAL DAILY
Status: DISCONTINUED | OUTPATIENT
Start: 2021-09-09 | End: 2021-09-09 | Stop reason: DRUGHIGH

## 2021-09-09 RX ORDER — AMIODARONE HYDROCHLORIDE 200 MG/1
100 TABLET ORAL NIGHTLY
Status: DISCONTINUED | OUTPATIENT
Start: 2021-09-09 | End: 2021-09-12 | Stop reason: HOSPADM

## 2021-09-09 RX ORDER — ACETAMINOPHEN 325 MG/1
650 TABLET ORAL EVERY 4 HOURS PRN
Status: DISCONTINUED | OUTPATIENT
Start: 2021-09-09 | End: 2021-09-12 | Stop reason: HOSPADM

## 2021-09-09 RX ORDER — NICOTINE POLACRILEX 4 MG
15 LOZENGE BUCCAL PRN
Status: DISCONTINUED | OUTPATIENT
Start: 2021-09-09 | End: 2021-09-12 | Stop reason: HOSPADM

## 2021-09-09 RX ORDER — AMIODARONE HYDROCHLORIDE 200 MG/1
200 TABLET ORAL DAILY
Status: DISCONTINUED | OUTPATIENT
Start: 2021-09-09 | End: 2021-09-09 | Stop reason: DRUGHIGH

## 2021-09-09 RX ORDER — POLYETHYLENE GLYCOL 3350 17 G/17G
17 POWDER, FOR SOLUTION ORAL DAILY PRN
Status: DISCONTINUED | OUTPATIENT
Start: 2021-09-09 | End: 2021-09-12 | Stop reason: HOSPADM

## 2021-09-09 RX ORDER — ATORVASTATIN CALCIUM 40 MG/1
TABLET, FILM COATED ORAL
Status: DISPENSED
Start: 2021-09-09 | End: 2021-09-10

## 2021-09-09 RX ORDER — GUAIFENESIN/DEXTROMETHORPHAN 100-10MG/5
SYRUP ORAL
Status: DISPENSED
Start: 2021-09-09 | End: 2021-09-10

## 2021-09-09 RX ORDER — ASPIRIN 81 MG/1
81 TABLET, CHEWABLE ORAL DAILY
Status: DISCONTINUED | OUTPATIENT
Start: 2021-09-10 | End: 2021-09-12 | Stop reason: HOSPADM

## 2021-09-09 RX ADMIN — APIXABAN 5 MG: 5 TABLET, FILM COATED ORAL at 20:19

## 2021-09-09 RX ADMIN — Medication 10 ML: at 20:18

## 2021-09-09 RX ADMIN — ACETAMINOPHEN 1000 MG: 500 TABLET ORAL at 16:28

## 2021-09-09 RX ADMIN — IPRATROPIUM BROMIDE AND ALBUTEROL SULFATE 2 AMPULE: .5; 3 SOLUTION RESPIRATORY (INHALATION) at 13:13

## 2021-09-09 RX ADMIN — ATORVASTATIN CALCIUM 40 MG: 40 TABLET, FILM COATED ORAL at 20:18

## 2021-09-09 RX ADMIN — ALBUTEROL SULFATE 2 PUFF: 90 AEROSOL, METERED RESPIRATORY (INHALATION) at 21:24

## 2021-09-09 RX ADMIN — IPRATROPIUM BROMIDE 2 PUFF: 17 AEROSOL, METERED RESPIRATORY (INHALATION) at 21:24

## 2021-09-09 RX ADMIN — TETANUS TOXOID, REDUCED DIPHTHERIA TOXOID AND ACELLULAR PERTUSSIS VACCINE, ADSORBED 0.5 ML: 5; 2.5; 8; 8; 2.5 SUSPENSION INTRAMUSCULAR at 13:44

## 2021-09-09 RX ADMIN — BACITRACIN ZINC, NEOMYCIN, POLYMYXIN B SULFAT: 5000; 3.5; 4 OINTMENT TOPICAL at 14:23

## 2021-09-09 RX ADMIN — AMIODARONE HYDROCHLORIDE 100 MG: 200 TABLET ORAL at 20:18

## 2021-09-09 ASSESSMENT — PAIN - FUNCTIONAL ASSESSMENT: PAIN_FUNCTIONAL_ASSESSMENT: ACTIVITIES ARE NOT PREVENTED

## 2021-09-09 ASSESSMENT — PAIN SCALES - GENERAL
PAINLEVEL_OUTOF10: 3
PAINLEVEL_OUTOF10: 0
PAINLEVEL_OUTOF10: 3
PAINLEVEL_OUTOF10: 0

## 2021-09-09 ASSESSMENT — PAIN DESCRIPTION - ORIENTATION: ORIENTATION: ANTERIOR

## 2021-09-09 ASSESSMENT — PAIN DESCRIPTION - DESCRIPTORS: DESCRIPTORS: ACHING

## 2021-09-09 ASSESSMENT — PAIN DESCRIPTION - PAIN TYPE: TYPE: ACUTE PAIN

## 2021-09-09 ASSESSMENT — PAIN DESCRIPTION - LOCATION: LOCATION: HEAD

## 2021-09-09 ASSESSMENT — PAIN DESCRIPTION - FREQUENCY: FREQUENCY: CONTINUOUS

## 2021-09-09 ASSESSMENT — PAIN DESCRIPTION - ONSET: ONSET: ON-GOING

## 2021-09-09 ASSESSMENT — PAIN DESCRIPTION - PROGRESSION: CLINICAL_PROGRESSION: NOT CHANGED

## 2021-09-09 NOTE — PROGRESS NOTES
Medication Reconciliation    List of medications for Alejandra Rivera is currently taking is complete.      Source of Information:   Epic records  Conversation with patient at bedside     Allergies  Allergy list not thoroughly reviewed with patient at this time  Allergies listed in Epic as follows: Diphenhydramine     Current Medications:    Current Facility-Administered Medications:     acetaminophen (TYLENOL) tablet 1,000 mg, 1,000 mg, Oral, Once, Javid Meraz, APRN - CNP    Current Outpatient Medications:     NIFEdipine (PROCARDIA XL) 30 MG extended release tablet, Take 30 mg by mouth daily, Disp: , Rfl:     fluticasone (FLONASE) 50 MCG/ACT nasal spray, 1 spray by Each Nostril route nightly as needed for Rhinitis or Allergies, Disp: , Rfl:     amiodarone (CORDARONE) 200 MG tablet, Take 100 mg by mouth nightly , Disp: , Rfl:     apixaban (ELIQUIS) 5 MG TABS tablet, Take 5 mg by mouth 2 times daily , Disp: , Rfl:     aspirin 81 MG chewable tablet, Take 81 mg by mouth daily , Disp: , Rfl:     atorvastatin (LIPITOR) 40 MG tablet, Take 40 mg by mouth nightly , Disp: , Rfl:     isosorbide mononitrate (IMDUR) 30 MG extended release tablet, Take 15 mg by mouth daily , Disp: , Rfl:     metFORMIN (GLUCOPHAGE) 500 MG tablet, Take 500 mg by mouth Daily with supper , Disp: , Rfl:     metoprolol succinate (TOPROL XL) 100 MG extended release tablet, Take 50 mg by mouth daily , Disp: , Rfl:     Notes Regarding Home Medications:   Patient received all of their AM home medications prior to arrival to the emergency department  Patient believes he takes 1/2 of a 100 mg metoprolol succinate tablet daily; Care Everywhere records suggest that dose is 100 mg QD  Changed amiodarone dose to 100 mg nightly  Added nifedipine and Flonase Forrest Stubbs, PharmD   9/9/2021 4:08 PM

## 2021-09-09 NOTE — ED NOTES
Bed: B-10  Expected date:   Expected time:   Means of arrival: [facility]  Comments:  syncope     Luis Alfredo Dang RN  09/09/21 6436

## 2021-09-09 NOTE — ED PROVIDER NOTES
[facility] Emergency Department    CHIEF COMPLAINT  Loss of Consciousness (Patient had syncopal episode at home. ..witnessed by bystander. On Eliquis for afib. States he has been feeling \"weak x2 days. \" Denies chest pain/SOB. States he has noticed wheezing lately.)      SHARED SERVICE VISIT  I have seen and evaluated this patient with my supervising physician, Dr. Kiya Guerra. HISTORY OF PRESENT ILLNESS  Maren Rose is a 78 y.o. nontoxic, well-appearing, but distressed male with medical history including, not limited to, atrial fibrillation, coronary artery disease, CVA, diabetes mellitus, hypertension, and MI who presents to the ED complaining of a 2-day history of increased fatigue and decreased appetite. States he went to bed last night and woke up this morning and was sitting at his kitchen table when he lost consciousness. This LOC was witnessed by an  who was in the home who reported that the patient hit his head on the baseboard. He does have a left eyebrow laceration. No active bleeding. There is an accompanying symptom of dizziness and chills. The patient endorses a recent change in his taste. His last BM was this a.m. and was a normal, formed, brown stool. He does have diffuse wheezing. No other complaints, modifying factors or associated symptoms. Nursing notes reviewed.    Past Medical History:   Diagnosis Date    Atrial fibrillation ([address]     CAD (coronary artery disease)     Cerebral artery occlusion with cerebral infarction ([address]     Diabetes mellitus ([address]     H/O heart artery stent     Hypertension     MI (myocardial infarction) ([address]      Past Surgical History:   Procedure Laterality Date    CARDIAC SURGERY      COLONOSCOPY      COLONOSCOPY N/A 8/7/2020    COLONOSCOPY POLYPECTOMY SNARE/COLD BIOPSY performed by Taty Aguilera MD at [facility] ENDOSCOPY     Family History   Problem Relation Age of Onset    Cancer Mother     Stroke Father Social History     Socioeconomic History    Marital status:      Spouse name: Not on file    Number of children: Not on file    Years of education: Not on file    Highest education level: Not on file   Occupational History    Not on file   Tobacco Use    Smoking status: Never Smoker    Smokeless tobacco: Never Used   Vaping Use    Vaping Use: Never used   Substance and Sexual Activity    Alcohol use: Never    Drug use: Never    Sexual activity: Never   Other Topics Concern    Not on file   Social History Narrative    Not on file     Social Determinants of Health     Financial Resource Strain:     Difficulty of Paying Living Expenses:    Food Insecurity:     Worried About Running Out of Food in the Last Year:     [address] in the Last Year:    Transportation Needs:     Lack of Transportation (Medical):      Lack of Transportation (Non-Medical):    Physical Activity:     Days of Exercise per Week:     Minutes of Exercise per Session:    Stress:     Feeling of Stress :    Social Connections:     Frequency of Communication with Friends and Family:     Frequency of Social Gatherings with Friends and Family:     Attends Religion Services:     Active Member of Clubs or Organizations:     Attends Club or Organization Meetings:     Marital Status:    Intimate Partner Violence:     Fear of Current or Ex-Partner:     Emotionally Abused:     Physically Abused:     Sexually Abused:      Current Facility-Administered Medications   Medication Dose Route Frequency Provider Last Rate Last Admin    Tetanus-Diphth-Acell Pertussis (BOOSTRIX) injection 0.5 mL  0.5 mL IntraMUSCular Once MARY Spann CNP        ipratropium-albuterol (DUONEB) nebulizer solution 2 ampule  2 ampule Inhalation Once MARY Dougherty CNP         Current Outpatient Medications   Medication Sig Dispense Refill    amiodarone (CORDARONE) 200 MG tablet Take 200 mg by mouth      apixaban (ELIQUIS) 5 MG TABS tablet Take 5 mg by mouth      aspirin 81 MG chewable tablet 81 mg      atorvastatin (LIPITOR) 40 MG tablet Take 40 mg by mouth      isosorbide mononitrate (IMDUR) 30 MG extended release tablet TAKE ONE-HALF TABLET BY MOUTH DAILY      metFORMIN (GLUCOPHAGE) 500 MG tablet Take 500 mg by mouth      metoprolol succinate (TOPROL XL) 100 MG extended release tablet Take 100 mg by mouth       Allergies   Allergen Reactions    Diphenhydramine Palpitations       REVIEW OF SYSTEMS  10 systems reviewed, pertinent positives per HPI otherwise noted to be negative    PHYSICAL EXAM  BP (!) 118/59   Pulse 59   Temp 99.8 °F (37.7 °C) (Oral)   Resp 19   Wt 199 lb 8.3 oz (90.5 kg)   SpO2 92%   BMI 27.06 kg/m²   GENERAL APPEARANCE: Awake and alert. Cooperative.  + Distress. Non-- toxic in appearance. HEAD: Normocephalic. No schuler sign. EYES: PERRL. EOM's grossly intact. No raccoons eyes. ENT: Mucous membranes are moist.  No hemotympanum. No otorrhea or rhinorrhea. NECK: Supple. There is no midline cervical spine tenderness upon palpation. HEART: RRR. No murmurs, rubs, or gallops.  + S1-S2  LUNGS:  Respirations unlabored.  + Diffuse wheezing. Moderate air exchange. Speaking comfortably in full sentences. ABDOMEN: Soft. Non-distended. Non-tender. No guarding or rebound.  + Bowel sounds x 4 quadrants. No masses. No organomegaly. Back: No midline thoracic or lumbar spine tenderness upon palpation. No CVAT. EXTREMITIES: No peripheral edema. Moves all extremities equally. All extremities neurovascularly intact. Bilateral upper and lower extremities exhibit full sensation, brisk cap refill <2 seconds, 2+ radial, DP/PT pulses, bilateral upper and lower extremities are warm, pink, dry, and neurovascularly intact with full strength upon flexion, extension, abduction, and adduction. SKIN: Warm and dry. No acute rashes. NEUROLOGICAL: Alert and oriented. CN's 2-12 intact.  No gross facial drooping. Strength 5/5, sensation intact. PSYCHIATRIC: Normal mood and affect. RADIOLOGY  No results found. ED COURSE  Patient did not require analgesia while in emergency department. Triage vitals stable but mildly hypotensive at 118/59 mmHg, bradycardic at 59 bpm.  A cardiac workup was initiated including urinalysis reflex to culture, COVID-19, lactic acid, troponin, BMP, CBC, EKG, CXR, CT head, CT cervical spine. PROCEDURE:  LACERATION REPAIR  Ghislaine Taylor or their surrogate had an opportunity to ask questions, and the risks, benefits, and alternatives were discussed. The wound was prepped and draped to maintain a sterile field. A local anesthetic was used to completely anesthetize the wound. It was copiously irrigated. It was explored to its depth in a bloodless field with no sign of tendon, nerve, or vascular injury. No foreign bodies were identified. The 2.5 cm wound was closed with 6-0 Ethilon sutures x5. There were no complications during the procedure.           Labs Ordered:  I have reviewed and interpreted all of the currently available lab results from this visit:  Results for orders placed or performed during the hospital encounter of 09/09/21   CBC Auto Differential   Result Value Ref Range    WBC 8.2 4.0 - 11.0 K/uL    RBC 4.59 4.20 - 5.90 M/uL    Hemoglobin 14.2 13.5 - 17.5 g/dL    Hematocrit 41.8 40.5 - 52.5 %    MCV 91.1 80.0 - 100.0 fL    MCH 31.0 26.0 - 34.0 pg    MCHC 34.0 31.0 - 36.0 g/dL    RDW 14.1 12.4 - 15.4 %    Platelets 752 585 - 113 K/uL    MPV 9.6 5.0 - 10.5 fL    Neutrophils % 67.2 %    Lymphocytes % 17.3 %    Monocytes % 13.7 %    Eosinophils % 0.8 %    Basophils % 1.0 %    Neutrophils Absolute 5.5 1.7 - 7.7 K/uL    Lymphocytes Absolute 1.4 1.0 - 5.1 K/uL    Monocytes Absolute 1.1 0.0 - 1.3 K/uL    Eosinophils Absolute 0.1 0.0 - 0.6 K/uL    Basophils Absolute 0.1 0.0 - 0.2 K/uL   Basic Metabolic Panel w/ Reflex to MG   Result Value Ref Range    Sodium 135 (L) 136 - 145 mmol/L    Potassium reflex Magnesium 4.3 3.5 - 5.1 mmol/L    Chloride 102 99 - 110 mmol/L    CO2 22 21 - 32 mmol/L    Anion Gap 11 3 - 16    Glucose 168 (H) 70 - 99 mg/dL    BUN 16 7 - 20 mg/dL    CREATININE 1.5 (H) 0.8 - 1.3 mg/dL    GFR Non-African American 45 (A) >60    GFR  54 (A) >60    Calcium 9.0 8.3 - 10.6 mg/dL   Urinalysis Reflex to Culture    Specimen: Urine, clean catch   Result Value Ref Range    Color, UA YELLOW Straw/Yellow    Clarity, UA Clear Clear    Glucose, Ur 250 (A) Negative mg/dL    Bilirubin Urine Negative Negative    Ketones, Urine Negative Negative mg/dL    Specific Gravity, UA 1.019 1.005 - 1.030    Blood, Urine Negative Negative    pH, UA 6.0 5.0 - 8.0    Protein, UA Negative Negative mg/dL    Urobilinogen, Urine 0.2 <2.0 E.U./dL    Nitrite, Urine Negative Negative    Leukocyte Esterase, Urine Negative Negative    Microscopic Examination Not Indicated     Urine Type NotGiven     Urine Reflex to Culture Not Indicated    Lactic Acid, Plasma   Result Value Ref Range    Lactic Acid 2.2 (H) 0.4 - 2.0 mmol/L   Troponin   Result Value Ref Range    Troponin <0.01 <0.01 ng/mL   TSH with Reflex   Result Value Ref Range    TSH 2.74 0.27 - 4.20 uIU/mL   POCT Glucose   Result Value Ref Range    POC Glucose 99 70 - 99 mg/dl    Performed on ACCU-CHEK    EKG 12 Lead   Result Value Ref Range    Ventricular Rate 56 BPM    Atrial Rate 56 BPM    P-R Interval 164 ms    QRS Duration 76 ms    Q-T Interval 470 ms    QTc Calculation (Bazett) 453 ms    P Axis 42 degrees    R Axis 28 degrees    T Axis 47 degrees    Diagnosis       Sinus bradycardiaNonspecific ST abnormalityAbnormal ECGWhen compared with ECG of 19-AUG-2019 10:36,T wave inversion no longer evident in Inferior leadsT wave inversion no longer evident in Anterolateral leadsQT has shortenedConfirmed by Mellissa, [address] [place] [address] ([id number]) on 9/9/2021 5:21:55 PM           Imaging ordered:  CT HEAD WO CONTRAST    Result Date: 9/9/2021  EXAMINATION: CT OF THE HEAD WITHOUT CONTRAST  9/9/2021 1:03 pm TECHNIQUE: CT of the head was performed without the administration of intravenous contrast. Dose modulation, iterative reconstruction, and/or weight based adjustment of the mA/kV was utilized to reduce the radiation dose to as low as reasonably achievable. COMPARISON: None. HISTORY: ORDERING SYSTEM PROVIDED HISTORY: HI/loc TECHNOLOGIST PROVIDED HISTORY: If patient is on cardiac monitor and/or pulse ox, they may be taken off cardiac monitor and pulse ox, left on O2 if currently on. All monitors reattached when patient returns to room. Has a \"code stroke\" or \"stroke alert\" been called? ->No Reason for exam:->HI/loc Decision Support Exception - unselect if not a suspected or confirmed emergency medical condition->Emergency Medical Condition (MA) Reason for Exam: HI, LOC Acuity: Acute Type of Exam: Initial FINDINGS: BRAIN/VENTRICLES: There is no acute intracranial hemorrhage, mass effect or midline shift. No abnormal extra-axial fluid collection. The gray-white differentiation is maintained without evidence of an acute infarct. There is no evidence of hydrocephalus. Mild-to-moderate senescent changes with parenchymal volume loss and chronic small vessel ischemic changes. ORBITS: The visualized portion of the orbits demonstrate no acute abnormality. SINUSES: Mastoid air cells are clear. Soft tissue noted in bilateral external artery meatus likely cerumen. Mild opacification of the right maxillary sinus and few ethmoid air cells noted. SOFT TISSUES/SKULL:  No acute abnormality of the visualized skull or soft tissues. No acute intracranial abnormality. Mild-to-moderate senescent changes with parenchymal volume loss and chronic small vessel ischemic changes. Mild paranasal sinus disease. RECOMMENDATIONS: If patient's symptoms are persistent or worsen, a follow-up head CT or MRI of the brain may be obtained.      CT CERVICAL SPINE WO CONTRAST    Result Date: 9/9/2021  EXAMINATION: CT OF THE CERVICAL SPINE WITHOUT CONTRAST 9/9/2021 1:03 pm TECHNIQUE: CT of the cervical spine was performed without the administration of intravenous contrast. Multiplanar reformatted images are provided for review. Dose modulation, iterative reconstruction, and/or weight based adjustment of the mA/kV was utilized to reduce the radiation dose to as low as reasonably achievable. COMPARISON: None. HISTORY: ORDERING SYSTEM PROVIDED HISTORY: LOC/HI/ TECHNOLOGIST PROVIDED HISTORY: Reason for exam:->LOC/HI/ Decision Support Exception - unselect if not a suspected or confirmed emergency medical condition->Emergency Medical Condition (MA) Reason for Exam: HI LOC Acuity: Acute Type of Exam: Initial There are multiple anterior bridging osteophytes. There is mild multilevel discogenic degenerative change. FINDINGS: BONES/ALIGNMENT: There is no acute fracture or traumatic malalignment. DEGENERATIVE CHANGES: No significant degenerative changes. SOFT TISSUES: There is no prevertebral soft tissue swelling. No acute abnormality of the cervical spine. XR CHEST PORTABLE    Result Date: 9/9/2021  EXAMINATION: ONE XRAY VIEW OF THE CHEST 9/9/2021 1:03 pm COMPARISON: Chest x-ray dated 01/27/2013. HISTORY: ORDERING SYSTEM PROVIDED HISTORY: SOB TECHNOLOGIST PROVIDED HISTORY: Reason for exam:->SOB Reason for Exam: SOB Acuity: Acute Type of Exam: Initial FINDINGS: HEART/MEDIASTINUM: The cardiomediastinal silhouette is within normal limits. PLEURA/LUNGS: There are no focal consolidations or pleural effusions. There is no appreciable pneumothorax. There is bibasilar atelectasis. BONES/SOFT TISSUE: No acute abnormality. No radiographic evidence of acute pulmonary disease.      VL DUP CAROTID BILATERAL    Result Date: 9/9/2021  Carotid Duplex Study  Demographics   Patient Name       Arias Cardenas   Date of Study      09/09/2021         Gender              Male   Patient Number     [id number]         Date of Birth       1940   Visit Number       [id number]          Age                 78 year(s)   Accession Number   [id number]         Room Number         010   Corporate ID       [id number]            Kalyani Waddell RVT   Ordering Physician Himanshu Rios MD        Physician           MD  Procedure Type of Study:   Cerebral:Carotid, VL CAROTID DUPLEX BILATERAL. Vascular Sonographer Report  Indications for Study:Syncope. Impressions Right Impression The right internal carotid artery appears to have a 0-15% diameter reducing stenosis based on velocity criteria. The right vertebral artery demonstrates abnormal antegrade flow. Moderate calcified plaque formation demonstrated in the bulb into the proximal Internal Carotid artery. Left Impression The left internal carotid artery appears to have a 50-79% diameter reducing stenosis based on velocity criteria. The left vertebral artery demonstrates abnormal antegrade flow. Moderate calcified plaque formation demonstrated in the bulb into the proximal Internal Carotid artery. Conclusions   Summary   The right internal carotid artery appears to have a 0-15% diameter reducing  stenosis based on velocity criteria. The right vertebral artery demonstrates abnormal antegrade flow. The left internal carotid artery appears to have a 50-79% diameter reducing  stenosis based on velocity criteria. The left vertebral artery demonstrates abnormal antegrade flow. Signature   ------------------------------------------------------------------  Electronically signed by Dontae Haider MD (Interpreting  physician) on 09/09/2021 at 05:29 PM  ------------------------------------------------------------------  Blood Pressure:Right arm 131/ mmHg. Left arm 135/ mmHg. Patient Status:STAT. Study [facility] - V[facility] Lab. Technical Quality:Adequate visualization.  Risk Factors History +---------+----+--------+ ! Diagnosis! Date! Comments! +---------+----+--------+ ! CAD      !    !        ! +---------+----+--------+   - The patient's risk factor(s) include: atrial fibrillation, diabetes     mellitus, arterial hypertension and prior MI . Velocities are measured in cm/s ; Diameters are measured in mm Carotid Right Measurements +---------+-----+----+-----+----+ ! Location ! PSV  ! EDV ! Angle! RI  ! +---------+-----+----+-----+----+ ! Prox CCA !60.3 !8.7 ! 60   !0.86! +---------+-----+----+-----+----+ ! Mid CCA  !55.9 !7.6 ! 60   !0.86! +---------+-----+----+-----+----+ ! Dist CCA !53.8 !9.4 ! 60   !0.83! +---------+-----+----+-----+----+ ! Prox ICA ! 94   !14. 9!60   !0.84! +---------+-----+----+-----+----+ ! Mid ICA  !103.9!23. 6! 60   !0.77! +---------+-----+----+-----+----+ ! Dist ICA !89.6 !18. 6!47   !0.79! +---------+-----+----+-----+----+ ! Prox ECA !87.4 !12. 7!60   !0.85! +---------+-----+----+-----+----+ ! Vertebral!27.2 !8   !60   !0.71! +---------+-----+----+-----+----+   - There is antegrade vertebral flow noted on the right side. - Additional Measurements:ICAPSV/CCAPSV 1.86. ICAEDV/CCAEDV 2.71. Carotid Left Measurements +---------+-----+----+-----+----+ ! Location ! PSV  ! EDV ! Angle! RI  ! +---------+-----+----+-----+----+ ! Prox CCA !84.6 !11. 9!60   !0.86! +---------+-----+----+-----+----+ ! Mid CCA  !71.4 !11. 9!60   !0.83! +---------+-----+----+-----+----+ ! Dist CCA !52.3 !10. 4!60   !0.8 ! +---------+-----+----+-----+----+ ! Prox ICA !152.7!24. 8!60   !0.84! +---------+-----+----+-----+----+ ! Mid ICA  !119.8!15. 7!60   !0.87! +---------+-----+----+-----+----+ ! Dist ICA ! 75.7 !14. 2! 60   !0.81! +---------+-----+----+-----+----+ ! Prox ECA !108.3!12. 7!60   !0.88! +---------+-----+----+-----+----+ ! Vertebral!35   !8.7 ! 60   !0.75! +---------+-----+----+-----+----+   - There is antegrade vertebral flow noted on the left side. - Additional Measurements:ICAPSV/CCAPSV 2.14. ICAEDV/CCAEDV 2.08. Reevaluation:   On reevaluation I find the very pleasant 78-year-old male resting comfortably in a semi-Fowlers position on stretcher with eyes open with stable vital signs. He remains awake, alert, and oriented x4. He denies pain, nausea, vomiting or other concerns. His wife who had previously been in the room during laceration repair is not present. He states that his wife went to talk to their daughter who was outside. A discussion was had with Mr. Mallie Jeans regarding syncope and collapse, forehead laceration need for tetanus booster, and intention to admit for further evaluation and treatment. Patient is agreeable to plan for admission. Risk management discussed and shared decision making had with patient and/or surrogate. All questions were answered. Patient will be admitted to the hospital for further evaluation prescription. CRITICAL CARE TIME  0 Minutes of critical care time spent not including separately billable procedures. MDM  Patient presents to the emergency department with syncope and collapse. Alternate diagnoses are less likely based on history and physical. Considered seizure, stroke, TIA, intracranial bleed, trauma, vasovagal reaction, orthostatic reaction/dehydration, medication side effect, intoxication, metabolic/electrolyte disturbance, blood sugar disturbance, among others less likely based on history physical.            Work-up reveals:  (Abnormal labs and imaging noted otherwise  normal)      Urinalysis reflex to culture: Glucose 250, otherwise unremarkable    COVID-19: Pending    Troponin: <0.01    BMP: Mild hyponatremia at 135, hyperglycemic at 116 mg/dL, chronic renal dysfunction with a creatinine of 1.5, GFR 45, otherwise unremarkable    CBC: Negative for leukocytosis or anemia, unremarkable    EKG: Sinus bradycardia. Nonspecific ST abnormality with a ventricular rate of 56 bpm, as interpreted by EMD.    CXR: No radiographic evidence of acute pulmonary disease.     CT head: No acute intracranial abnormality. Mild to moderate senescent changes with parenchymal volume loss and chronic small vessel ischemic changes. Mild paranasal sinus disease. CT cervical spine: No acute abnormality of the cervical spine. Interventions: Patient was placed on cardiac monitoring upon arrival, he was given breathing treatments x2 for mild wheezing, tetanus for laceration with laceration repair of 2.5 cm laceration noted to the left forehead. Simple wound closure utilizing 6-0 Ethilon interrupted sutures x5 with close approximation. Patient tolerated this process well. Also, patient was given a gram of Tylenol. Therefore:    My attending physician nor I feel that the patient is safe or appropriate for discharge to home as he experienced a syncopal episode and requires further evaluation and treatment to rule out cardiac etiology. Of note, the patient is bradycardic in the emergency department in the high 50s. Patient is agreeable with plan for admission. Clinical Impression:  Syncope and collapse  Forehead laceration  Need for tetanus booster      Disposition:  Admitted      Patient will be admitted to hospital for further evaluation and treatment. Hospitalist: Dr. Kylah Sanchez      Discussed patients HPI, ED work-up, results, treatment, and response with my attending physician Dr. Liliya Khalil and the Hospitalist -Dr. Kylah Sanchez who agrees to admit the patient to the hospital.          [facility]    This chart was created using Dragon dictation. Every effort was made by myself to ensure accuracy, however due to limitations of this technology errors may be present.       MARY Loera - CNP  09/10/21 [id number]

## 2021-09-09 NOTE — ED NOTES
ED SBAR report provider to Mitch ramos, [address]. Patient to be transported to Room 4272 via stretcher by transport tech. Patient transported with bedside cardiac monitor and with IV medications infusing. IV site clean, dry, and intact. MEWS score and pain assessed and documented. Updated patient and family on plan of care.      Danyel Cantrell RN  09/09/21 1758

## 2021-09-09 NOTE — ED PROVIDER NOTES
I have personally performed a face to face diagnostic evaluation on this patient. I have fully participated in the care of this patient. I have reviewed and agree with all pertinent clinical information including history, physical exam, diagnostic tests, and the plan. HPI: Vale Kwon presented with syncope. Been weak for the last 2 days. No obvious preceding symptoms. Chevis Sensor struck his head required sutures on the left side. No chest pain no shortness of breath. Chief Complaint   Patient presents with    Loss of Consciousness     Patient had syncopal episode at home. ..witnessed by bystander. On Eliquis for afib. States he has been feeling \"weak x2 days. \" Denies chest pain/SOB. States he has noticed wheezing lately. Review of Systems: See JENNIFER note  Vital Signs: BP (!) 131/57   Pulse 61   Temp 99.8 °F (37.7 °C) (Oral)   Resp 21   Wt 199 lb 8.3 oz (90.5 kg)   SpO2 92%   BMI 27.06 kg/m²     Alert 78 y.o. male who does not appear toxic or acutely ill  HENT: Atraumatic, oral mucosa moist  Neck: Grossly normal ROM  Chest/Lungs: respiratory effort normal   Abdomen: Soft nontender  Extremities: 2+ radial bilaterally  Musculoskeletal: Grossly normal ROM  Skin: No palor or diaphoresis    Medical Decision Making and Plan:  Pertinent Labs & Imaging studies reviewed. (See JENNIFER chart for details)  I agree with assessment and plan. Admission for cardiac syncope.        EKG Interpretation    Interpreted by emergency department physician    Rhythm: normal sinus   Rate: 50-60  Axis: normal  Ectopy: none  Conduction: normal  ST Segments: no acute change  T Waves: no acute change  Q Waves: none    Clinical Impression: sinus bradycardia    MD Stephanie Loyd MD  09/09/21 [id number]

## 2021-09-10 ENCOUNTER — APPOINTMENT (OUTPATIENT)
Dept: CT IMAGING | Age: 81
DRG: 315 | End: 2021-09-10
Payer: MEDICARE

## 2021-09-10 LAB
ANION GAP SERPL CALCULATED.3IONS-SCNC: 12 MMOL/L (ref 3–16)
BASOPHILS ABSOLUTE: 0.1 K/UL (ref 0–0.2)
BASOPHILS RELATIVE PERCENT: 0.7 %
BUN BLDV-MCNC: 14 MG/DL (ref 7–20)
CALCIUM SERPL-MCNC: 9.1 MG/DL (ref 8.3–10.6)
CHLORIDE BLD-SCNC: 101 MMOL/L (ref 99–110)
CO2: 22 MMOL/L (ref 21–32)
CREAT SERPL-MCNC: 1.2 MG/DL (ref 0.8–1.3)
EOSINOPHILS ABSOLUTE: 0 K/UL (ref 0–0.6)
EOSINOPHILS RELATIVE PERCENT: 0.5 %
GFR AFRICAN AMERICAN: >60
GFR NON-AFRICAN AMERICAN: 58
GLUCOSE BLD-MCNC: 118 MG/DL (ref 70–99)
GLUCOSE BLD-MCNC: 121 MG/DL (ref 70–99)
GLUCOSE BLD-MCNC: 123 MG/DL (ref 70–99)
GLUCOSE BLD-MCNC: 173 MG/DL (ref 70–99)
GLUCOSE BLD-MCNC: 176 MG/DL (ref 70–99)
GLUCOSE BLD-MCNC: 191 MG/DL (ref 70–99)
HCT VFR BLD CALC: 43.5 % (ref 40.5–52.5)
HEMOGLOBIN: 14.5 G/DL (ref 13.5–17.5)
LV EF: 60 %
LVEF MODALITY: NORMAL
LYMPHOCYTES ABSOLUTE: 1.9 K/UL (ref 1–5.1)
LYMPHOCYTES RELATIVE PERCENT: 23.2 %
MCH RBC QN AUTO: 30.2 PG (ref 26–34)
MCHC RBC AUTO-ENTMCNC: 33.4 G/DL (ref 31–36)
MCV RBC AUTO: 90.5 FL (ref 80–100)
MONOCYTES ABSOLUTE: 1.1 K/UL (ref 0–1.3)
MONOCYTES RELATIVE PERCENT: 13.3 %
NEUTROPHILS ABSOLUTE: 5.1 K/UL (ref 1.7–7.7)
NEUTROPHILS RELATIVE PERCENT: 62.3 %
PDW BLD-RTO: 14.1 % (ref 12.4–15.4)
PERFORMED ON: ABNORMAL
PLATELET # BLD: 179 K/UL (ref 135–450)
PMV BLD AUTO: 9.5 FL (ref 5–10.5)
POTASSIUM REFLEX MAGNESIUM: 3.8 MMOL/L (ref 3.5–5.1)
RBC # BLD: 4.81 M/UL (ref 4.2–5.9)
REPORT: NORMAL
RESPIRATORY PANEL PCR: NORMAL
SARS-COV-2: NOT DETECTED
SODIUM BLD-SCNC: 135 MMOL/L (ref 136–145)
WBC # BLD: 8.2 K/UL (ref 4–11)

## 2021-09-10 PROCEDURE — 87040 BLOOD CULTURE FOR BACTERIA: CPT

## 2021-09-10 PROCEDURE — 93306 TTE W/DOPPLER COMPLETE: CPT

## 2021-09-10 PROCEDURE — 80048 BASIC METABOLIC PNL TOTAL CA: CPT

## 2021-09-10 PROCEDURE — 6370000000 HC RX 637 (ALT 250 FOR IP): Performed by: NURSE PRACTITIONER

## 2021-09-10 PROCEDURE — 6360000002 HC RX W HCPCS: Performed by: INTERNAL MEDICINE

## 2021-09-10 PROCEDURE — 85025 COMPLETE CBC W/AUTO DIFF WBC: CPT

## 2021-09-10 PROCEDURE — 99222 1ST HOSP IP/OBS MODERATE 55: CPT | Performed by: INTERNAL MEDICINE

## 2021-09-10 PROCEDURE — 6370000000 HC RX 637 (ALT 250 FOR IP): Performed by: INTERNAL MEDICINE

## 2021-09-10 PROCEDURE — 94761 N-INVAS EAR/PLS OXIMETRY MLT: CPT

## 2021-09-10 PROCEDURE — 0202U NFCT DS 22 TRGT SARS-COV-2: CPT

## 2021-09-10 PROCEDURE — 2580000003 HC RX 258: Performed by: INTERNAL MEDICINE

## 2021-09-10 PROCEDURE — 2060000000 HC ICU INTERMEDIATE R&B

## 2021-09-10 PROCEDURE — 94640 AIRWAY INHALATION TREATMENT: CPT

## 2021-09-10 PROCEDURE — 87641 MR-STAPH DNA AMP PROBE: CPT

## 2021-09-10 PROCEDURE — 71260 CT THORAX DX C+: CPT

## 2021-09-10 PROCEDURE — 36415 COLL VENOUS BLD VENIPUNCTURE: CPT

## 2021-09-10 PROCEDURE — 99222 1ST HOSP IP/OBS MODERATE 55: CPT | Performed by: NURSE PRACTITIONER

## 2021-09-10 PROCEDURE — 6360000004 HC RX CONTRAST MEDICATION: Performed by: INTERNAL MEDICINE

## 2021-09-10 RX ORDER — DEXAMETHASONE SODIUM PHOSPHATE 10 MG/ML
10 INJECTION, SOLUTION INTRAMUSCULAR; INTRAVENOUS ONCE
Status: COMPLETED | OUTPATIENT
Start: 2021-09-10 | End: 2021-09-10

## 2021-09-10 RX ADMIN — ACETAMINOPHEN 650 MG: 325 TABLET ORAL at 04:39

## 2021-09-10 RX ADMIN — Medication 2 PUFF: at 20:34

## 2021-09-10 RX ADMIN — Medication 2 PUFF: at 08:00

## 2021-09-10 RX ADMIN — Medication 2 PUFF: at 20:33

## 2021-09-10 RX ADMIN — ATORVASTATIN CALCIUM 40 MG: 40 TABLET, FILM COATED ORAL at 21:12

## 2021-09-10 RX ADMIN — AMIODARONE HYDROCHLORIDE 100 MG: 200 TABLET ORAL at 21:13

## 2021-09-10 RX ADMIN — APIXABAN 5 MG: 5 TABLET, FILM COATED ORAL at 09:26

## 2021-09-10 RX ADMIN — ASPIRIN 81 MG: 81 TABLET, CHEWABLE ORAL at 09:25

## 2021-09-10 RX ADMIN — METOPROLOL SUCCINATE 50 MG: 50 TABLET, EXTENDED RELEASE ORAL at 09:25

## 2021-09-10 RX ADMIN — Medication 10 ML: at 21:12

## 2021-09-10 RX ADMIN — Medication 10 ML: at 09:26

## 2021-09-10 RX ADMIN — INSULIN LISPRO 1 UNITS: 100 INJECTION, SOLUTION INTRAVENOUS; SUBCUTANEOUS at 21:14

## 2021-09-10 RX ADMIN — ISOSORBIDE MONONITRATE 30 MG: 30 TABLET, EXTENDED RELEASE ORAL at 09:25

## 2021-09-10 RX ADMIN — DEXAMETHASONE SODIUM PHOSPHATE 10 MG: 10 INJECTION, SOLUTION INTRAMUSCULAR; INTRAVENOUS at 14:19

## 2021-09-10 RX ADMIN — SODIUM CHLORIDE 25 ML: 9 INJECTION, SOLUTION INTRAVENOUS at 14:22

## 2021-09-10 RX ADMIN — IOPAMIDOL 75 ML: 755 INJECTION, SOLUTION INTRAVENOUS at 15:18

## 2021-09-10 RX ADMIN — CEFEPIME HYDROCHLORIDE 2000 MG: 2 INJECTION, POWDER, FOR SOLUTION INTRAVENOUS at 14:25

## 2021-09-10 RX ADMIN — INSULIN LISPRO 2 UNITS: 100 INJECTION, SOLUTION INTRAVENOUS; SUBCUTANEOUS at 18:08

## 2021-09-10 RX ADMIN — APIXABAN 5 MG: 5 TABLET, FILM COATED ORAL at 21:12

## 2021-09-10 ASSESSMENT — PAIN SCALES - GENERAL
PAINLEVEL_OUTOF10: 0

## 2021-09-10 NOTE — CONSULTS
The NP's RICKY Christian NP) documentation has been prepared under my direction and personally reviewed by me in its entirety. I confirm that the consultation note created by the NP accurately reflects all work, physical examination, the discussion of treatments and procedures, and medical decision making by the NP. In addition, I have personally met with; performed a physical examination on; discussed the diagnosis (-es), treatment options including procedures, and formulated medical decisions for this patient. In brief, Nicola Newman 78 y.o. male h/o CAD s/p PTCA '98, bilat cardotid stenosis, PAF on amiodarone and Eliquis and follows Dr. Mu Long at [place] [address], who has recently had a \"viral illness\" with decreased PO intake and presents with syncope and fall due to hypotension. Will await echo to evaluate LVEF and any wall motion abnormalities or valvular disease. Would suspect that it is unremarkable, compared to the echo that he had in 2019. If echo normal, will sign off and the patient is recommended to increase hydration and proper nutrition. He may follow-up with Dr. Mu Long at [place] [address]. Please refer to the NP's consult note for full details on the assessment and plan. Thank you for allowing us to participate in the care of your patient. If you have any questions, please do not hesitate to contact us.      Eliana Currie MD, MS, [facility] [place]  Cardiac Electrophysiology  [address]  [address] [facility], [address] [place] [address]  [facility] [address]  [phone]

## 2021-09-10 NOTE — PROGRESS NOTES
Hospitalist   Progress Note    Patient Name: Fe Bobo  PCP: Raya Goff MD  Date of Admission: 9/9/2021    Chief Complaint on Admission: Passing out at home earlier today  Chief diagnosis after evaluation: Under evaluation    Brief Synopsis: Patient 78 y.o. man with a history of hypertension, hyperlipidemia, diabetes mellitus, coronary artery disease and atrial fibrillation who was admitted on 9/9/2021 for evaluation and treatment after a syncopal episode at home. EP is evaluating. Following admission Pt developed a fever. Unclear origin at this point. Pt Seen/Examined and Chart Reviewed. Subjective: Pt complains of wheezing. Has been febrile    Objective:   Allergies  Diphenhydramine    Medications    Scheduled Meds:   cefepime  2,000 mg IntraVENous Q12H    isosorbide mononitrate  30 mg Oral Daily    atorvastatin  40 mg Oral Nightly    aspirin  81 mg Oral Daily    apixaban  5 mg Oral BID    insulin lispro  0-12 Units SubCUTAneous TID WC    insulin lispro  0-6 Units SubCUTAneous Nightly    sodium chloride flush  10 mL IntraVENous 2 times per day    amiodarone  100 mg Oral Nightly    metoprolol succinate  50 mg Oral Daily    ipratropium  2 puff Inhalation BID    And    albuterol sulfate HFA  2 puff Inhalation BID     Infusions:   dextrose      sodium chloride 25 mL (09/10/21 1422)     PRN Meds:  glucose, dextrose, glucagon (rDNA), dextrose, sodium chloride flush, sodium chloride, ondansetron, polyethylene glycol, acetaminophen **OR** acetaminophen, albuterol sulfate HFA    Physical    VITALS:  BP (!) 156/69   Pulse 59   Temp 98.7 °F (37.1 °C) (Oral)   Resp 16   Ht 6' 1\" (1.854 m)   Wt 199 lb 11.8 oz (90.6 kg)   SpO2 94%   BMI 26.35 kg/m²   CONSTITUTIONAL:  WD/WN 78y.o. year-old male who is awake, alert, cooperative, no apparent distress, and appears stated age  EYES:  Lids and lashes normal, PERRL, EOMI, sclera clear, conjunctiva normal  ENT:  NC/AT, MMM    NECK:  Supple, UROBILINOGEN 0.2 09/09/2021    BILIRUBINUR Negative 09/09/2021    BLOODU Negative 09/09/2021    GLUCOSEU 250 09/09/2021    PROTEINU Negative 09/09/2021     ABG:  No results found for: PHART, LDT4KUP, A1SWGXWC, QCC5ULD, BEART, THGBART, PO2ART, LDY1NSB        Intake/Output Summary (Last 24 hours) at 9/10/2021 1619  Last data filed at 9/9/2021 2018  Gross per 24 hour   Intake 10 ml   Output --   Net 10 ml       Consults:  IP CONSULT TO HOSPITALIST  IP CONSULT TO CARDIOLOGY  PHARMACY TO DOSE VANCOMYCIN  IP CONSULT TO INFECTIOUS DISEASES      Active Hospital Problems    Diagnosis Date Noted    Syncope and collapse [R55] 09/09/2021    Essential hypertension [I10] 09/09/2021    Hyperlipidemia [E78.5] 09/09/2021    DMII (diabetes mellitus, type 2) ([facility] [address] [E11.9] 09/09/2021    Atrial fibrillation ([facility] [address] [I48.91]     CAD (coronary artery disease) [I25.10]        ASSESSMENT AND PLAN:    2 day h/o increased weakness, fatigue and loss of appetite. Being tested for COVID    FUO -chest x-ray without infiltrates. Urinalysis without UTI. We will check a CT of his chest with and without contrast.  Collect blood cultures x2 and consult infectious disease. Will start vancomycin and cefepime empirically. Wheezing -patient does not have a history of COPD. We will start dexamethasone and await CT of the chest     Syncope and Collapse - the etiology is unclear, but likely associated with poor intake, low BP at home. CT of the head was unremarkable for acute pathology.    - Per EP   - likely related to hypotension as EMS got lower BP than what pt usually runs, he was conscious at the time of their arrival so likely BP had improved some, likely related to acute illness and poor PO intake recently with very little fluid intake yesterday              - orthostatics were negative              - arrhythmia seems less likely, was in SR when EMS arrived and has been in SR since              - recommended pt to try to eat regularly and stay well hydrated, should sit or lie down if symptomatic              - nifedipine has been held which is appropriate              - echo pending     Atrial fibrillation ([facility] [address] - currently in NSR. Continue metoprolol, amiodarone and Eliquis.     CAD (coronary artery disease) - Continue beta blocker, statin and aspirin. Monitor on telemetry.      Diabetes mellitus II - SSI and carb control diet     Essential (primary) hypertension - continue home meds and monitor blood pressure     Hyperlipidemia - No current evidence of Rhabdomyolysis or other adverse effects. Continue statin therapy while in the hospital      DVT Prophylaxis: Eliquis  Diet: ADULT DIET;  Regular; 5 carb choices (75 gm/meal)  Code Status: Full Code    PT/OT Eval Status: Not Ordered    Dispo - Anticipated discharge date unclear    Ethan Yeager MD, MD

## 2021-09-10 NOTE — CARE COORDINATION
INITIAL CASE MANAGEMENT ASSESSMENT    Reviewed chart, met with patient, wife & daughters to assess possible discharge needs. Explained Case Management role/services. Living Situation: verified address, lives in a 1 story home with 2 THIAGO    ADLs: independent     Transportation: active , family can take him home at dc     Medications: no barriers but said Eliquis is getting pricey, called retail pharmacy for assistance but unable to since he is Medicare    PCP: David Burnette MD    PLAN/COMMENTS: denies any needs except for Eliquis getting too expensive, unable to assist since on medicare, plan is to return home at dc    CM provided contact information for patient or family to call with any questions. CM will follow and assist as needed.     Janina Tipton RN, BSN,   [phone]    Electronically signed by Janina Tipton RN on 9/10/2021 at 12:07 PM

## 2021-09-10 NOTE — CARE COORDINATION
Advance Care Planning     Advance Care Planning Activator (Inpatient)  Conversation Note      Date of ACP Conversation: 9/10/2021     Conversation Conducted with: Patient with Decision Making Capacity    ACP Activator: Tammy Quiros RN    Health Care Decision Maker:     Current Designated Health Care Decision Maker:     Zeina Moya, Pat-spouse    Today we documented Decision Maker(s) consistent with Legal Next of Kin hierarchy. Care Preferences    Ventilation: \"If you were in your present state of health and suddenly became very ill and were unable to breathe on your own, what would your preference be about the use of a ventilator (breathing machine) if it were available to you? \"      Would the patient desire the use of ventilator (breathing machine)?: yes    \"If your health worsens and it becomes clear that your chance of recovery is unlikely, what would your preference be about the use of a ventilator (breathing machine) if it were available to you? \"     Would the patient desire the use of ventilator (breathing machine)?: No      Resuscitation  \"CPR works best to restart the heart when there is a sudden event, like a heart attack, in someone who is otherwise healthy. Unfortunately, CPR does not typically restart the heart for people who have serious health conditions or who are very sick. \"    \"In the event your heart stopped as a result of an underlying serious health condition, would you want attempts to be made to restart your heart (answer \"yes\" for attempt to resuscitate) or would you prefer a natural death (answer \"no\" for do not attempt to resuscitate)? \" yes       [] Yes   [] No   Educated Patient / Jes Bourne regarding differences between Advance Directives and portable DNR orders.     Length of ACP Conversation in minutes:  6 min  Conversation Outcomes:  [x] ACP discussion completed  [] Existing advance directive reviewed with patient; no changes to patient's previously recorded wishes  [] New Advance Directive completed  [] Portable Do Not Rescitate prepared for Provider review and signature  [] POLST/POST/MOLST/MOST prepared for Provider review and signature      Follow-up plan:    [] Schedule follow-up conversation to continue planning  [] Referred individual to Provider for additional questions/concerns   [] Advised patient/agent/surrogate to review completed ACP document and update if needed with changes in condition, patient preferences or care setting    [] This note routed to one or more involved healthcare providers        Nu Stephen RN, BSN,   [phone]  Electronically signed by Nu Stephen RN on 9/10/2021 at 12:14 PM

## 2021-09-10 NOTE — CONSULTS
Cardiac Electrophysiology Consultation     Date: 9/10/2021  Admit Date:  9/9/2021  Admission Diagnosis: Syncope and collapse [R55]  Need for tetanus booster [Z23]  Forehead laceration, initial encounter [S01.81XA]     Reason for Consultation: syncope  Consult Requesting Physician: Jewel Sharpe MD       History of Present Illness  Zelda Jackson is an 78y.o. year old male with past medical history significant for CAD s/p PTCA of PLB in 1998, bilateral carotid stenosis, PAF, HTN, HLD and DM who presented to the ED following a syncopal event. He has been feeling poorly for about the last 3 weeks. He was around his grandson who was ill and thought maybe he had caught a \"virus\" from him. He started to feel better over the last week but continued to feel very fatigued and has not been eating well. He has been trying to drink fluid but says that yesterday he did not drink much at all throughout the day. He was having an appraisal on his home and he went to sit down, he does not recall anything after sitting down. Previously, he was feeling very unsteady like he was \"drunk. \"  He may have felt a little lightheaded as well. Denies any chest pain or palpitations. His wife found him slumped over in the chair and then he eventually fell over onto the floor hitting his head, he is unsure how long he was unconscious. He recalls waking up and the  being there and later on his son. He wanted to get up off the floor but says he felt very uncoordinated at the time and could not manage to get up. When EMS arrived to his home yesterday his blood pressure was noted to be lower than his normal, initial blood pressure is 96/76 and then follow-up blood pressure was 94/54. His heart rate was in the 60s and he was noted to be in sinus rhythm per the EMS report.   The patient says that that blood pressure is significantly lower than what is his normal as he checks his blood pressure on a daily basis and systolics are usually 110s-120s. He does admit to wheezing which has been going on over the last couple weeks. He notices this mostly while lying in bed and particularly while lying on his left side. Denies any cough or sputum. No vomiting but he has had some nausea. No diarrhea. He did have a fever overnight while in the hospital. He denies any recent medication changes. Past Medical History:   Diagnosis Date    Atrial fibrillation ([facility] [address]     CAD (coronary artery disease)     Cerebral artery occlusion with cerebral infarction ([facility] [address]     Diabetes mellitus ([facility] [address]     H/O heart artery stent     Hypertension     MI (myocardial infarction) ([facility] [address]         Past Surgical History:   Procedure Laterality Date    CARDIAC SURGERY      COLONOSCOPY      COLONOSCOPY N/A 8/7/2020    COLONOSCOPY POLYPECTOMY SNARE/COLD BIOPSY performed by Cheryl Hurt MD at [facility] ENDOSCOPY       Current Outpatient Medications   Medication Instructions    amiodarone (CORDARONE) 100 mg, Oral, NIGHTLY    apixaban (ELIQUIS) 5 mg, Oral, 2 TIMES DAILY    aspirin 81 mg, Oral, DAILY    atorvastatin (LIPITOR) 40 mg, Oral, NIGHTLY    fluticasone (FLONASE) 50 MCG/ACT nasal spray 1 spray, Each Nostril, NIGHTLY PRN    isosorbide mononitrate (IMDUR) 15 mg, Oral, DAILY    metFORMIN (GLUCOPHAGE) 500 mg, Oral, DAILY WITH DINNER    metoprolol succinate (TOPROL XL) 50 mg, Oral, DAILY    NIFEdipine (PROCARDIA XL) 30 mg, Oral, DAILY        Allergies   Allergen Reactions    Diphenhydramine Palpitations       Social History:   reports that he has never smoked. He has never used smokeless tobacco. He reports that he does not drink alcohol and does not use drugs. Family History:  family history includes Cancer in his mother; Stroke in his father.      Review of Systems:  · General: positive for fever, chills, fatigue   · Ophthalmic ROS: negative for eye pain or loss of vision  · ENT ROS: negative for headaches, sore throat, nasal drainage  · Respiratory: positive for wheezing, negative for cough, sputum, SOB  · Cardiovascular: negative for chest pain, palpitations.   · Gastrointestinal: positive for nausea and poor PO intake, negative for abdominal pain, diarrhea, vomiting  · Hematology: negative for bleeding, blood clots, bruising or jaundice  · Genito-Urinary: positive for nocturia, negative for dysuria or incontinence  · Musculoskeletal: negative for joint swelling, muscle pain  · Neurological: negative for confusion, dizziness, headaches   · Psychiatric: negative anxiety, depression  · Dermatological: negative for rash    Medications:  Scheduled Meds:   isosorbide mononitrate  30 mg Oral Daily    atorvastatin  40 mg Oral Nightly    aspirin  81 mg Oral Daily    apixaban  5 mg Oral BID    insulin lispro  0-12 Units SubCUTAneous TID WC    insulin lispro  0-6 Units SubCUTAneous Nightly    sodium chloride flush  10 mL IntraVENous 2 times per day    amiodarone  100 mg Oral Nightly    metoprolol succinate  50 mg Oral Daily    guaiFENesin-dextromethorphan        dexamethasone (PF)        ipratropium  2 puff Inhalation BID    And    albuterol sulfate HFA  2 puff Inhalation BID      Continuous Infusions:   dextrose      sodium chloride       PRN Meds:.glucose, dextrose, glucagon (rDNA), dextrose, sodium chloride flush, sodium chloride, ondansetron, polyethylene glycol, acetaminophen **OR** acetaminophen, albuterol sulfate HFA     Physical Examination:  Vitals:    09/10/21 0545   BP:    Pulse:    Resp:    Temp: 100.2 °F (37.9 °C)   SpO2:         Intake/Output Summary (Last 24 hours) at 9/10/2021 0814  Last data filed at 2021  Gross per 24 hour   Intake 10 ml   Output --   Net 10 ml     In: 10 [I.V.:10]  Out: -    Wt Readings from Last 3 Encounters:   09/10/21 199 lb 11.8 oz (90.6 kg)   20 194 lb 9.6 oz (88.3 kg)   19 177 lb 7.5 oz (80.5 kg)     Temp  Av.1 °F (37.8 °C)  Min: 98.2 °F (36.8 °C)  Max: 102.8 °F (39.3 °C)  Pulse  Avg: 62.9  Min: 54  Max: 74  BP  Min: 112/60  Max: 179/81  SpO2  Av.1 %  Min: 92 %  Max: 100 %    · Telemetry: Sinus rhythm in the 60s. · Constitutional: Alert, in no acute distress. Appears stated age. · Head: Normocephalic and atraumatic. · Eyes: Conjunctivae normal. EOM are normal.   · Neck: Neck supple. No lymphadenopathy. No rigidity. No JVD present. · Cardiovascular: Normal rate, regular rhythm. No murmurs, rubs or gallops. No S3 or S4.  · Pulmonary/Chest: Inspiratory and expiratory wheezes bilaterally. No crackle or rhonchi. No respiratory accessory muscle use. · Abdominal: Soft. Normal bowel sounds present. No distension, No tenderness. · Musculoskeletal: No tenderness. No edema    · Lymphadenopathy: Has no cervical adenopathy. · Neurological: Alert and oriented. No gross deficits. · Skin: Skin is warm and dry. No rash, lesions, ulcerations noted. · Psychiatric: No anxiety nor agitation. Labs:  Reviewed. Recent Labs     21  1238   *   K 4.3      CO2 22   BUN 16   CREATININE 1.5*     Recent Labs     21  1238   WBC 8.2   HGB 14.2   HCT 41.8   MCV 91.1        Lab Results   Component Value Date    TROPONINI <0.01 2021     No results found for: BNP  No results found for: PROTIME, INR  Lab Results   Component Value Date    CHOL 196 2012    HDL 40 2012    TRIG 417 2012       Diagnostic and imaging results reviewed. EC21  SB at 56 BPM. Non-specific ST-T wave changes. Echo: 19  - Left ventricle: The cavity size is normal. Wall thickness is at   [facility] upper limits of normal. Systolic function was normal. The     calculated ejection fraction was in the range of 52% to 57%. Wall     motion was normal; there were no regional wall motion     abnormalities. Doppler parameters are consistent with abnormal     left ventricular relaxation (grade 1 diastolic dysfunction). - Aortic valve: Mild thickening.   - Mitral valve:  There is mild regurgitation.   - Inferior vena cava: The vessel was normal in size; the     respirophasic diameter changes were in the normal range (&gt;= 50%);     findings are consistent with normal central venous pressure. Cath: 12/11/15  100% RCA obst. with collateralization distal vessel.  30% mid LAD with mod to severe plaquing throughout the vessel with no significant obstruction.  EF 55%. Assessment & Plan:    Syncope   - likely related to hypotension as EMS got lower BP than what pt usually runs, he was conscious at the time of their arrival so likely BP had improved some, likely related to acute illness and poor PO intake recently with very little fluid intake yesterday   - orthostatics were negative   - arrhythmia seems less likely, was in SR when EMS arrived and has been in SR since   - recommended pt to try to eat regularly and stay well hydrated, should sit or lie down if symptomatic   - nifedipine has been held which is appropriate   - echo pending    Fever     - Tmax 102.8 overnight   - covid negative   - does have wheezing on exam, CXR negative   - work up/treatment per primary    Paroxysmal atrial fibrillation   - first diagnosed in ?2019 per noting from [facility]   - on amiodarone 100mg QD, Toprol 50mg QD   - CHADS2-VASc 5 (age, gender, HTN, DM, CAD) on Eliquis 5mg BID - creatinine 1.5 so he is somewhat borderline for needing the 2.5mg dose, will see what creatinine is on today's labs when available   - follows with Dr. Aries Reynaga in [facility]    CAD   - with remote anigoplasty   - on Toprol, aspirin, statin and Imdur    Bilateral carotid artery stenosis   - LICA with 37-31% stenosis and AUGIE with 0-15%   - continue aspirin, statin   - follow up with Dr. Aries Reynaga    Discussed with Dr. Zahida Washington. No further inpatient EP work up needed, will sign off. Pt has scheduled appointment with his cardiologist later this month which he was advised to keep.     MARY Reyes  [address]  [address] [facility] [place] [address], [address] [place] [address]  Phone: [phone]  Fax: [phone]    Electronically signed by Darylene Basta, APRN - CNP on 9/10/2021 at 8:14 AM

## 2021-09-10 NOTE — RT PROTOCOL NOTE
RT Inhaler-Nebulizer Bronchodilator Protocol Note    There is a bronchodilator order in the chart from a provider indicating to follow the RT Bronchodilator Protocol and there is an Initiate RT Bronchodilator Protocol order as well (see protocol at bottom of note). The findings from the last RT Protocol Assessment were as follows:  Smoking: Non smoker  Surgical Status: No surgery  Xray: Multiple lobe infiltrates/atelectasis/pleural effusion/edema (atelecasis bilaterally)  Respiratory Pattern: Dyspnea at rest  Mental Status: Alert and Oriented  Breath Sounds: Wheezing and/or rhonchi  Cough: Strong, spontaneous, non-productive  Activity Level: Walking unassisted  Oxygen Requirement: Room Air - 2LNC/28% or home setting  Indication for Bronchodilator Therapy: Wheezing associated with pulm disorder  Bronchodilator Assessment Score: 9    Aerosolized bronchodilator medication orders have been revised according to the RT Bronchodilator Protocol. RT Inhaler-Nebulizer Bronchodilator Protocol:    Respiratory Therapist to perform RT Therapy Protocol Assessment then follow the protocol. No Indications - adjust the frequency to every 6 hours PRN wheezing or bronchospasm, if no treatments needed after 48 hours then discontinue using Per Protocol order mode. If indication present, adjust the RT bronchodilator orders based on the Bronchodilator Assessment Score as follows:    0-6 - enter or revise RT bronchodilator order to Albuterol Inhaler order with frequency of every 2 hours PRN for wheezing or increased work of breathing using Per Protocol order mode. If Albuterol Inhaler not tolerated or not effective, then discontinue the Albuterol Inhaler order and enter Albuterol Nebulizer order with same frequency and PRN reasons. Repeat RT Therapy Protocol Assessment as needed.     7-10 - discontinue any other Inpatient aerosolized bronchodilator medication orders and enter or revise two Albuterol Inhaler orders, one with BID frequency and one with frequency of every 2 hours PRN wheezing or increased work of breathing using Per Protocol order mode. Repeat RT Therapy Protocol Assessment with second treatment then BID and as needed. If Albuterol Inhaler not tolerated or not effective, then discontinue the Albuterol Inhaler orders and enter two Albuterol Nebulizer orders with same frequencies and PRN reasons. 11-13 - discontinue any other Inpatient aerosolized bronchodilator medication orders and enter DuoNeb Nebulizer orders QID frequency and an Albuterol Nebulizer order every 2 hours PRN wheezing or increased work of breathing using Per Protocol order mode. Repeat RT Therapy Protocol Assessment with second treatment then QID and as needed. Greater than 13 - discontinue any other Inpatient bronchodilator aerosolized medication orders and enter DuoNeb Nebulizer order every 4 hours frequency and Albuterol Nebulizer every 2 hours PRN wheezing or increased work of breathing using Per Protocol order mode. Repeat RT Therapy Protocol Assessment with second treatment then every 4 hours and as needed. RT to enter RT Home Evaluation for COPD & MDI Assessment order using Per Protocol order mode.     Electronically signed by Carolyn Flores RCP on 9/9/2021 at 9:34 PM

## 2021-09-10 NOTE — PROGRESS NOTES
4 Eyes Skin Assessment     NAME:  Amanda Hayes  YOB: 1940  MEDICAL RECORD NUMBER:  [id number]    The patient is being assess for  Admission    I agree that 2 RN's have performed a thorough Head to Toe Skin Assessment on the patient. ALL assessment sites listed below have been assessed. Areas assessed by both nurses:    Head, Face, Ears, Shoulders, Back, Chest, Arms, Elbows, Hands, Sacrum. Buttock, Coccyx, Ischium and Legs. Feet and Heels        Does the Patient have a Wound? Yes wound(s) were present on assessment. LDA wound assessment was Initiated and completed . Laceration above left eye (eye brow), sutures placed in ED.        Beni Prevention initiated:  No   Wound Care Orders initiated:  No    Pressure Injury (Stage 3,4, Unstageable, DTI, NWPT, and Complex wounds) if present place consult order under [de-identified] No    New and Established Ostomies if present place consult order under : No      Nurse 1 eSignature: Electronically signed by Akil Butterfield RN on 9/9/21 at 11:39 PM EDT    **SHARE this note so that the co-signing nurse is able to place an eSignature**    Nurse 2 eSignature: Electronically signed by Juan Espinosa RN on 9/10/21 at 12:30 AM EDT

## 2021-09-10 NOTE — H&P
Date Taking? Authorizing Provider   NIFEdipine (PROCARDIA XL) 30 MG extended release tablet Take 30 mg by mouth daily 3/11/21  Yes Historical Provider, MD   fluticasone (FLONASE) 50 MCG/ACT nasal spray 1 spray by Each Nostril route nightly as needed for Rhinitis or Allergies   Yes Historical Provider, MD   amiodarone (CORDARONE) 200 MG tablet Take 100 mg by mouth nightly  7/29/19  Yes Historical Provider, MD   apixaban (ELIQUIS) 5 MG TABS tablet Take 5 mg by mouth 2 times daily  7/9/19  Yes Historical Provider, MD   aspirin 81 MG chewable tablet Take 81 mg by mouth daily    Yes Historical Provider, MD   atorvastatin (LIPITOR) 40 MG tablet Take 40 mg by mouth nightly    Yes Historical Provider, MD   isosorbide mononitrate (IMDUR) 30 MG extended release tablet Take 15 mg by mouth daily  12/21/18  Yes Historical Provider, MD   metFORMIN (GLUCOPHAGE) 500 MG tablet Take 500 mg by mouth Daily with supper  12/11/15  Yes Historical Provider, MD   metoprolol succinate (TOPROL XL) 100 MG extended release tablet Take 50 mg by mouth daily  7/24/19  Yes Historical Provider, MD       Family History:       Problem Relation Age of Onset    Cancer Mother     Stroke Father      Social History:   TOBACCO:   reports that he has never smoked. He has never used smokeless tobacco.  ETOH:   reports no history of alcohol use. OCCUPATION:      REVIEW OF SYSTEMS:  A full review of systems was performed and is negative except for that which appears in the HPI    Physical Exam:    Vitals: BP (!) 150/76   Pulse 54   Temp 98.2 °F (36.8 °C) (Oral)   Resp 17   Wt 199 lb 8.3 oz (90.5 kg)   SpO2 92%   BMI 27.06 kg/m²   General appearance: WD/WN 78y.o. year-old male who is alert, appears stated age and is cooperative  HEENT: Head: Normocephalic, no lesions, without obvious abnormality. Eye: Normal external eye, conjunctiva, lids cornea, PEERL. Ears: Normal external ears. Non-tender.   Nose: Normal external nose, mucus membranes and septum. Pharynx: Dental Hygiene adequate. Normal buccal mucosa. Normal pharynx. Neck: no adenopathy, no carotid bruit, no JVD, supple, symmetrical, trachea midline and thyroid not enlarged, symmetric, no tenderness/mass/nodules  Lungs: clear to auscultation bilaterally and no use of accessory muscles. Heart: regular rate and rhythm, S1, S2 normal, no murmur, click, rub or gallop and normal apical impulse  Abdomen: soft, non-tender; bowel sounds normal; no masses, no organomegaly  Extremities: extremities atraumatic, no cyanosis or edema and Homans sign is negative, no sign of DVT. Capillary Refill: Acceptable < 3 seconds   Peripheral Pulses: +3 easily felt, not easily obliterated with pressures   Skin: Abrasion to left forehead, otherwise skin color, texture, turgor normal. No rashes or lesions on exposed skin  Neurologic: Neurovascularly intact without any focal sensory/motor deficits. Cranial nerves: II-XII intact, grossly non-focal. Gait was not tested. Mental Status: Alert and oriented, thought content appropriate, normal insight    CBC:   Recent Labs     09/09/21  1238   WBC 8.2   HGB 14.2        BMP:    Recent Labs     09/09/21  1238   *   K 4.3      CO2 22   BUN 16   CREATININE 1.5*   GLUCOSE 168*     Troponin:   Recent Labs     09/09/21  1238   TROPONINI <0.01     PT/INR:  No results found for: PTINR  U/A:    Lab Results   Component Value Date    LEUKOCYTESUR Negative 09/09/2021    SPECGRAV 1.019 09/09/2021    UROBILINOGEN 0.2 09/09/2021    BILIRUBINUR Negative 09/09/2021    BLOODU Negative 09/09/2021    GLUCOSEU 250 09/09/2021    PROTEINU Negative 09/09/2021         RAD:   I have independently reviewed and interpreted the imaging studies below and based my recommendations to the patient on those findings.     CT HEAD WO CONTRAST    Result Date: 9/9/2021  EXAMINATION: CT OF THE HEAD WITHOUT CONTRAST  9/9/2021 1:03 pm TECHNIQUE: CT of the head was performed without the administration of intravenous contrast. Dose modulation, iterative reconstruction, and/or weight based adjustment of the mA/kV was utilized to reduce the radiation dose to as low as reasonably achievable. COMPARISON: None. HISTORY: ORDERING SYSTEM PROVIDED HISTORY: HI/loc TECHNOLOGIST PROVIDED HISTORY: If patient is on cardiac monitor and/or pulse ox, they may be taken off cardiac monitor and pulse ox, left on O2 if currently on. All monitors reattached when patient returns to room. Has a \"code stroke\" or \"stroke alert\" been called? ->No Reason for exam:->HI/loc Decision Support Exception - unselect if not a suspected or confirmed emergency medical condition->Emergency Medical Condition (MA) Reason for Exam: HI, LOC Acuity: Acute Type of Exam: Initial FINDINGS: BRAIN/VENTRICLES: There is no acute intracranial hemorrhage, mass effect or midline shift. No abnormal extra-axial fluid collection. The gray-white differentiation is maintained without evidence of an acute infarct. There is no evidence of hydrocephalus. Mild-to-moderate senescent changes with parenchymal volume loss and chronic small vessel ischemic changes. ORBITS: The visualized portion of the orbits demonstrate no acute abnormality. SINUSES: Mastoid air cells are clear. Soft tissue noted in bilateral external artery meatus likely cerumen. Mild opacification of the right maxillary sinus and few ethmoid air cells noted. SOFT TISSUES/SKULL:  No acute abnormality of the visualized skull or soft tissues. No acute intracranial abnormality. Mild-to-moderate senescent changes with parenchymal volume loss and chronic small vessel ischemic changes. Mild paranasal sinus disease. RECOMMENDATIONS: If patient's symptoms are persistent or worsen, a follow-up head CT or MRI of the brain may be obtained.      CT CERVICAL SPINE WO CONTRAST    Result Date: 9/9/2021  EXAMINATION: CT OF THE CERVICAL SPINE WITHOUT CONTRAST 9/9/2021 1:03 pm TECHNIQUE: CT of the cervical spine was performed without the administration of intravenous contrast. Multiplanar reformatted images are provided for review. Dose modulation, iterative reconstruction, and/or weight based adjustment of the mA/kV was utilized to reduce the radiation dose to as low as reasonably achievable. COMPARISON: None. HISTORY: ORDERING SYSTEM PROVIDED HISTORY: LOC/HI/ TECHNOLOGIST PROVIDED HISTORY: Reason for exam:->LOC/HI/ Decision Support Exception - unselect if not a suspected or confirmed emergency medical condition->Emergency Medical Condition (MA) Reason for Exam: HI LOC Acuity: Acute Type of Exam: Initial There are multiple anterior bridging osteophytes. There is mild multilevel discogenic degenerative change. FINDINGS: BONES/ALIGNMENT: There is no acute fracture or traumatic malalignment. DEGENERATIVE CHANGES: No significant degenerative changes. SOFT TISSUES: There is no prevertebral soft tissue swelling. No acute abnormality of the cervical spine. XR CHEST PORTABLE    Result Date: 9/9/2021  EXAMINATION: ONE XRAY VIEW OF THE CHEST 9/9/2021 1:03 pm COMPARISON: Chest x-ray dated 01/27/2013. HISTORY: ORDERING SYSTEM PROVIDED HISTORY: SOB TECHNOLOGIST PROVIDED HISTORY: Reason for exam:->SOB Reason for Exam: SOB Acuity: Acute Type of Exam: Initial FINDINGS: HEART/MEDIASTINUM: The cardiomediastinal silhouette is within normal limits. PLEURA/LUNGS: There are no focal consolidations or pleural effusions. There is no appreciable pneumothorax. There is bibasilar atelectasis. BONES/SOFT TISSUE: No acute abnormality. No radiographic evidence of acute pulmonary disease.      VL DUP CAROTID BILATERAL    Result Date: 9/9/2021  Carotid Duplex Study  Demographics   Patient Name       Emma Lester   Date of Study      09/09/2021         Gender              Male   Patient Number     [id number]         Date of Birth       1940   Visit Number       [id number]          Age                 78 year(s)   Accession Number [id number]         Room Number         010   Corporate ID       [id number]            Susanna Reeder RVT   Ordering Physician Reyes Brothers, MD        Physician           MD  Procedure Type of Study:   Cerebral:Carotid, VL CAROTID DUPLEX BILATERAL. Vascular Sonographer Report  Indications for Study:Syncope. Impressions Right Impression The right internal carotid artery appears to have a 0-15% diameter reducing stenosis based on velocity criteria. The right vertebral artery demonstrates abnormal antegrade flow. Moderate calcified plaque formation demonstrated in the bulb into the proximal Internal Carotid artery. Left Impression The left internal carotid artery appears to have a 50-79% diameter reducing stenosis based on velocity criteria. The left vertebral artery demonstrates abnormal antegrade flow. Moderate calcified plaque formation demonstrated in the bulb into the proximal Internal Carotid artery. Conclusions   Summary   The right internal carotid artery appears to have a 0-15% diameter reducing  stenosis based on velocity criteria. The right vertebral artery demonstrates abnormal antegrade flow. The left internal carotid artery appears to have a 50-79% diameter reducing  stenosis based on velocity criteria. The left vertebral artery demonstrates abnormal antegrade flow. Signature   ------------------------------------------------------------------  Electronically signed by Trav Yañez MD (Interpreting  physician) on 09/09/2021 at 05:29 PM  ------------------------------------------------------------------  Blood Pressure:Right arm 131/ mmHg. Left arm 135/ mmHg. Patient Status:STAT. [facility]. Technical Quality:Adequate visualization. Risk Factors History +---------+----+--------+ ! Diagnosis! Date! Comments! +---------+----+--------+ ! CAD      !    !        ! +---------+----+--------+   - The patient's risk factor(s) include: atrial fibrillation, diabetes     mellitus, arterial hypertension and prior MI . Velocities are measured in cm/s ; Diameters are measured in mm Carotid Right Measurements +---------+-----+----+-----+----+ ! Location ! PSV  ! EDV ! Angle! RI  ! +---------+-----+----+-----+----+ ! Prox CCA !60.3 !8.7 ! 60   !0.86! +---------+-----+----+-----+----+ ! Mid CCA  !55.9 !7.6 ! 60   !0.86! +---------+-----+----+-----+----+ ! Dist CCA !53.8 !9.4 ! 60   !0.83! +---------+-----+----+-----+----+ ! Prox ICA ! 94   !14. 9!60   !0.84! +---------+-----+----+-----+----+ ! Mid ICA  !103.9!23. 6! 60   !0.77! +---------+-----+----+-----+----+ ! Dist ICA !89.6 !18. 6!47   !0.79! +---------+-----+----+-----+----+ ! Prox ECA !87.4 !12. 7!60   !0.85! +---------+-----+----+-----+----+ ! Vertebral!27.2 !8   !60   !0.71! +---------+-----+----+-----+----+   - There is antegrade vertebral flow noted on the right side. - Additional Measurements:ICAPSV/CCAPSV 1.86. ICAEDV/CCAEDV 2.71. Carotid Left Measurements +---------+-----+----+-----+----+ ! Location ! PSV  ! EDV ! Angle! RI  ! +---------+-----+----+-----+----+ ! Prox CCA !84.6 !11. 9!60   !0.86! +---------+-----+----+-----+----+ ! Mid CCA  !71.4 !11. 9!60   !0.83! +---------+-----+----+-----+----+ ! Dist CCA !52.3 !10. 4!60   !0.8 ! +---------+-----+----+-----+----+ ! Prox ICA !152.7!24. 8!60   !0.84! +---------+-----+----+-----+----+ ! Mid ICA  !119.8!15. 7!60   !0.87! +---------+-----+----+-----+----+ ! Dist ICA ! 75.7 !14. 2! 60   !0.81! +---------+-----+----+-----+----+ ! Prox ECA !108.3!12. 7!60   !0.88! +---------+-----+----+-----+----+ ! Vertebral!35   !8.7 ! 60   !0.75! +---------+-----+----+-----+----+   - There is antegrade vertebral flow noted on the left side. - Additional Measurements:ICAPSV/CCAPSV 2.14. ICAEDV/CCAEDV 2.08.         EKG:   Read by ER in the absence of a Cardiologist shows  Rhythm: normal sinus   Rate: 50-60  Axis: normal  Ectopy: none  Conduction: normal  ST Segments: no acute change  T Waves: no acute change  Q Waves: none     Clinical Impression: sinus bradycardia         Assessment:   Principal Problem:    Syncope and collapse  Active Problems:    Atrial fibrillation (HCC)    Essential hypertension    Hyperlipidemia    DMII (diabetes mellitus, type 2) (HCC)    CAD (coronary artery disease)  Resolved Problems:    * No resolved hospital problems. *      Plan:       2 day h/o increased weakness, fatigue and loss of appetite - Will check for COVID    Syncope and Collapse - the etiology is unclear, but likely associated with poor intake. CT of the head was unremarkable for acute pathology. Pt will be admitted and monitored on Telemetry. We will check thyroid function tests, orthostatic blood pressure and pulse, an echocardiogram and carotid doppler studies. Cardiology has been consulted to help with evaluation and treatment. Atrial fibrillation ([address] -currently in NSR. Continue metoprolol, amiodarone and Eliquis. CAD (coronary artery disease) - Continue beta blocker, statin and aspirin. Monitor on telemetry. Diabetes mellitus II - SSI and carb control diet    Essential (primary) hypertension - continue home meds and monitor blood pressure    Hyperlipidemia - No current evidence of Rhabdomyolysis or other adverse effects. Continue statin therapy while in the hospital            DVT Prophylaxis: Eliquis  Diet: ADULT DIET; Regular; 5 carb choices (75 gm/meal)  Code Status: Full Code  (Advanced care planning has been discussed with patient and/or responsible family member and is reflected in the code status.  Further orders associated with this have been entered if appropriate)    Disposition: Anticipate that patient will remain in the hospital for 2 to 3 days depending on further evaluation and clinical course    Please note that over 50 minutes was spent in evaluating the patient, review of records and results, discussion with staff/family, etc.    Robert Bobo MD, MD

## 2021-09-10 NOTE — CONSULTS
Infectious Diseases Inpatient Consult Note      Reason for Consult:  Fevers and Dizziness, cough and wheezing     Requesting Physician:  Dr Teo Watts     Primary Care Physician:  Jen Momin MD    History Obtained From:  Epic and patient      CHIEF COMPLAINT:     Chief Complaint   Patient presents with    Loss of Consciousness     Patient had syncopal episode at home. ..witnessed by bystander. On Eliquis for afib. States he has been feeling \"weak x2 days. \" Denies chest pain/SOB. States he has noticed wheezing lately. HISTORY OF PRESENT ILLNESS:  78 y.o. man with syncope at home  Injured Left eyebrow area was feeling dizzy, weak, poor oral intake and cough with some wheeze recently He has runny nose for x 2 weeks per patient and now with wheeze hence presented to hospital. Creat 1.5, CBC WITH diff normal and UA -ve COVID 19 negative and CT chest negative.  T max 102.8 hence admitted for evaluation.:        Past Medical History:    Past Medical History:   Diagnosis Date    Atrial fibrillation ([address]     CAD (coronary artery disease)     Cerebral artery occlusion with cerebral infarction ([address]     Diabetes mellitus ([address]     H/O heart artery stent     Hypertension     MI (myocardial infarction) ([address]        Past Surgical History:    Past Surgical History:   Procedure Laterality Date    CARDIAC SURGERY      COLONOSCOPY      COLONOSCOPY N/A 8/7/2020    COLONOSCOPY POLYPECTOMY SNARE/COLD BIOPSY performed by Nelson Lee MD at [facility] ENDOSCOPY       Current Medications:    Outpatient Medications Marked as Taking for the 9/9/21 encounter [facility] HOSP[facility] Encounter)   Medication Sig Dispense Refill    NIFEdipine (PROCARDIA XL) 30 MG extended release tablet Take 30 mg by mouth daily      fluticasone (FLONASE) 50 MCG/ACT nasal spray 1 spray by Each Nostril route nightly as needed for Rhinitis or Allergies      amiodarone (CORDARONE) 200 MG tablet Take 100 mg by mouth nightly       apixaban (ELIQUIS) 5 headaches, slurred speech, unilateral weakness  Psychiatric: negative for hallucinations,confusion,agitation.      PHYSICAL EXAM:      Vitals:  T max  102.8   BP (!) 156/69   Pulse 59   Temp 98.7 °F (37.1 °C) (Oral)   Resp 16   Ht 6' 1\" (1.854 m)   Wt 199 lb 11.8 oz (90.6 kg)   SpO2 94%   BMI 26.35 kg/m²     General Appearance: alert,in no acute distress, no pallor, no icterus   Skin: warm and dry, no rash or erythema  Head: normocephalic and atraumatic  Eyes: pupils equal, round, and reactive to light, conjunctivae normal  ENT: tympanic membrane, external ear and ear canal normal bilaterally, nose without deformity, nasal mucosa and turbinates normal without polyps  Neck: supple and non-tender without mass, no thyromegaly  no cervical lymphadenopathy  Pulmonary/Chest: clear to auscultation bilaterally- no wheezes, rales or rhonchi, normal air movement, no respiratory distress  Cardiovascular: normal rate, regular rhythm, normal S1 and S2, no murmurs, rubs, clicks, or gallops, no carotid bruits  Abdomen: soft, non-tender, non-distended, normal bowel sounds, no masses or organomegaly  Extremities: no cyanosis, clubbing or edema  Musculoskeletal: normal range of motion, no joint swelling, deformity or tenderness  Integumentary: No rashes, no abnormal skin lesions, no petechiae  Neurologic: reflexes normal and symmetric, no cranial nerve deficit  Psych:  Orientation, sensorium, mood normal   Lines: IV   DATA:    CBC:   Lab Results   Component Value Date    WBC 8.2 09/10/2021    HGB 14.5 09/10/2021    HCT 43.5 09/10/2021    MCV 90.5 09/10/2021     09/10/2021     RENAL:   Lab Results   Component Value Date    CREATININE 1.2 09/10/2021    BUN 14 09/10/2021     (L) 09/10/2021    K 3.8 09/10/2021     09/10/2021    CO2 22 09/10/2021     SED RATE: No results found for: SEDRATE  CK: No results found for: CKTOTAL  CRP: No results found for: CRP  Hepatic Function Panel:   Lab Results   Component Value Date    ALKPHOS 74 08/19/2019    ALT 42 08/19/2019    AST 39 08/19/2019    PROT 7.4 08/19/2019    PROT 7.9 03/06/2012    BILITOT 0.3 08/19/2019    BILIDIR 0.12 03/06/2012    IBILI 0.3 03/06/2012    LABALBU 3.2 08/19/2019     UA:  Lab Results   Component Value Date    COLORU YELLOW 09/09/2021    CLARITYU Clear 09/09/2021    GLUCOSEU 250 09/09/2021    BILIRUBINUR Negative 09/09/2021    KETUA Negative 09/09/2021    SPECGRAV 1.019 09/09/2021    BLOODU Negative 09/09/2021    PHUR 6.0 09/09/2021    PROTEINU Negative 09/09/2021    UROBILINOGEN 0.2 09/09/2021    NITRU Negative 09/09/2021    LEUKOCYTESUR Negative 09/09/2021    LABMICR Not Indicated 09/09/2021    URINETYPE NotGiven 09/09/2021      Urine Microscopic: No results found for: LABCAST, BACTERIA, COMU, HYALCAST, WBCUA, RBCUA, EPIU  Urine Reflex to Culture:   Lab Results   Component Value Date    URRFLXCULT Not Indicated 09/09/2021     Creat  1.5     Lactic acid  2.2     9/10/2021  4:41 AM - Wyatt Forman Incoming Lab Results From Soft (Epic Adt)         Component Value Ref Range & Units Status Collected Lab   SARS-CoV-2 Not Detected  Not detected Final 09/09/2021  2:25 PM [facility]   This test has been authorized by FDA under an   Emergency Use Authorization (EUA). This test is only authorized for the duration of the   time of declaration that circumstances exist justifying the   authorization of the emergency use of in vitro diagnostic   testing for detection of the SARS-CoV-2 virus   and/or diagnosis of COVID-19 infection under   section 564 (b)(1) of the Act, 21 U. S.C. 302XMQ-4 (b) (1),   unless the authorization is terminated or revoked sooner.           MICRO: cultures reviewed and updated by me     Procedure Component Value Units Date/Time   MRSA DNA Probe, Nasal [[id number]]    Order Status: No result Specimen: Nares    Culture, Blood 1 [[id number]] Collected: 09/10/21 1344   Order Status: Sent Specimen: Blood Updated: 09/10/21 1349   Culture, Blood 2 [[id number]] Collected: 09/10/21 1344   Order Status: Sent Specimen: Blood Updated: 09/10/21 1349       Blood Culture: No results found for: BC, BLOODCULT2    Viral Culture:    Lab Results   Component Value Date    COVID19 Not Detected 09/09/2021    COVID19 NOT DETECTED 08/03/2020     Urine Culture: No results for input(s): Ewa Brooms in the last 72 hours. Scheduled Meds:   cefepime  2,000 mg IntraVENous Q12H    vancomycin (VANCOCIN) intermittent dosing (placeholder)   Other RX Placeholder    vancomycin  1,500 mg IntraVENous Q24H    isosorbide mononitrate  30 mg Oral Daily    atorvastatin  40 mg Oral Nightly    aspirin  81 mg Oral Daily    apixaban  5 mg Oral BID    insulin lispro  0-12 Units SubCUTAneous TID WC    insulin lispro  0-6 Units SubCUTAneous Nightly    sodium chloride flush  10 mL IntraVENous 2 times per day    amiodarone  100 mg Oral Nightly    metoprolol succinate  50 mg Oral Daily    ipratropium  2 puff Inhalation BID    And    albuterol sulfate HFA  2 puff Inhalation BID       Continuous Infusions:   dextrose      sodium chloride 25 mL (09/10/21 1422)       PRN Meds:  glucose, dextrose, glucagon (rDNA), dextrose, sodium chloride flush, sodium chloride, ondansetron, polyethylene glycol, acetaminophen **OR** acetaminophen, albuterol sulfate HFA    Imaging:   VL DUP CAROTID BILATERAL   Final Result      XR CHEST PORTABLE   Final Result   No radiographic evidence of acute pulmonary disease. CT HEAD WO CONTRAST   Final Result   No acute intracranial abnormality. Mild-to-moderate senescent changes with parenchymal volume loss and chronic   small vessel ischemic changes. Mild paranasal sinus disease. RECOMMENDATIONS:   If patient's symptoms are persistent or worsen, a follow-up head CT or MRI of   the brain may be obtained. CT CERVICAL SPINE WO CONTRAST   Final Result   No acute abnormality of the cervical spine.          CT CHEST W CONTRAST    (Results Pending) All pertinent images and reports for the current Hospitalization were reviewed by me. IMPRESSION:    Patient Active Problem List   Diagnosis    Syncope and collapse    Atrial fibrillation (HCC)    Essential hypertension    Hyperlipidemia    DMII (diabetes mellitus, type 2) ([address]    CAD (coronary artery disease)         Syncope on admit  Fevers and chills  Wheeze with resp symptoms   COVID 19 negative on admit  WBC normal   Lactic acid mild elevation    A fib on anticoagulation  CT chest :  Bi lateral lower lobe tree in but appearance      Will check resp virus panel given the WBC normal and Lactic acid has improved      Labs, Microbiology, Radiology and pertinent results from current hospitalization and care every where were reviewed by me as a part of the consultation. PLAN :  1. Cont IV Cefepime   2. D/C IV Vancomycin   3. Resp virus panel  Add on the NP specimen  4. CT chest results noted   5. Check Procal in AM       Discussed with patient/Family and Nursing     Thanks for allowing me to participate in your patient's care please call me with any questions or concerns.     Dr. Jono Islas MD  [facility] [place] Physician  Phone: [phone]   Fax : [phone]

## 2021-09-10 NOTE — CONSULTS
Clinical Pharmacy Note  Vancomycin Consult    Rhona Manzanares is a 78 y.o. male ordered Vancomycin for sepsis; consult received from Dr. Maite Valero to manage therapy. Also receiving cefepime. Patient Active Problem List   Diagnosis    Syncope and collapse    [facility] ([facility]    Essential hypertension    Hyperlipidemia    DMII (diabetes mellitus, type 2) ([facility]    CAD (coronary artery disease)       Allergies:  Diphenhydramine     Temp max:  Temp (24hrs), Av.7 °F (37.6 °C), Min:98 °F (36.7 °C), Max:102.8 °F (39.3 °C)      Recent Labs     21  1238 09/10/21  0909   WBC 8.2 8.2       Recent Labs     21  1238 09/10/21  0909   BUN 16 14   CREATININE 1.5* 1.2         Intake/Output Summary (Last 24 hours) at 9/10/2021 1427  Last data filed at 2021 2018  Gross per 24 hour   Intake 10 ml   Output --   Net 10 ml       Culture Results:  pending    Ht Readings from Last 1 Encounters:   21 6' 1\" (1.854 m)        Wt Readings from Last 1 Encounters:   09/10/21 199 lb 11.8 oz (90.6 kg)         Estimated Creatinine Clearance: 54 mL/min (based on SCr of 1.2 mg/dL). Assessment/Plan:  1. Will start vancomycin 1500 mg IV Q24hrs  2. Regimen projects a trough level of 12.4 mg/L and AUC of 463 mcg*hr/mL (assumed MIRANDA 1 mcg/mL)  3. Will check a vancomycin trough on  at 1500   4. Monitor renal function (urine output, BUN/SCr). Dose adjustments may be necessary with a significant change in renal function. Thank you for the consult. Will continue to follow.   Armida Jean PharmD  9/10/2021  2:27 PM

## 2021-09-11 LAB
ANION GAP SERPL CALCULATED.3IONS-SCNC: 11 MMOL/L (ref 3–16)
BASOPHILS ABSOLUTE: 0 K/UL (ref 0–0.2)
BASOPHILS RELATIVE PERCENT: 0.1 %
BUN BLDV-MCNC: 19 MG/DL (ref 7–20)
CALCIUM SERPL-MCNC: 9.1 MG/DL (ref 8.3–10.6)
CHLORIDE BLD-SCNC: 102 MMOL/L (ref 99–110)
CO2: 21 MMOL/L (ref 21–32)
CREAT SERPL-MCNC: 1 MG/DL (ref 0.8–1.3)
EOSINOPHILS ABSOLUTE: 0 K/UL (ref 0–0.6)
EOSINOPHILS RELATIVE PERCENT: 0 %
GFR AFRICAN AMERICAN: >60
GFR NON-AFRICAN AMERICAN: >60
GLUCOSE BLD-MCNC: 147 MG/DL (ref 70–99)
GLUCOSE BLD-MCNC: 151 MG/DL (ref 70–99)
GLUCOSE BLD-MCNC: 171 MG/DL (ref 70–99)
GLUCOSE BLD-MCNC: 175 MG/DL (ref 70–99)
GLUCOSE BLD-MCNC: 243 MG/DL (ref 70–99)
HCT VFR BLD CALC: 43.8 % (ref 40.5–52.5)
HEMOGLOBIN: 14.8 G/DL (ref 13.5–17.5)
LYMPHOCYTES ABSOLUTE: 1.3 K/UL (ref 1–5.1)
LYMPHOCYTES RELATIVE PERCENT: 13.8 %
MCH RBC QN AUTO: 30.4 PG (ref 26–34)
MCHC RBC AUTO-ENTMCNC: 33.7 G/DL (ref 31–36)
MCV RBC AUTO: 90.2 FL (ref 80–100)
MONOCYTES ABSOLUTE: 0.7 K/UL (ref 0–1.3)
MONOCYTES RELATIVE PERCENT: 7.2 %
MRSA SCREEN RT-PCR: NORMAL
NEUTROPHILS ABSOLUTE: 7.5 K/UL (ref 1.7–7.7)
NEUTROPHILS RELATIVE PERCENT: 78.9 %
PDW BLD-RTO: 13.9 % (ref 12.4–15.4)
PERFORMED ON: ABNORMAL
PLATELET # BLD: 193 K/UL (ref 135–450)
PMV BLD AUTO: 9.5 FL (ref 5–10.5)
POTASSIUM REFLEX MAGNESIUM: 4.4 MMOL/L (ref 3.5–5.1)
PROCALCITONIN: 0.08 NG/ML (ref 0–0.15)
RBC # BLD: 4.85 M/UL (ref 4.2–5.9)
SODIUM BLD-SCNC: 134 MMOL/L (ref 136–145)
WBC # BLD: 9.5 K/UL (ref 4–11)

## 2021-09-11 PROCEDURE — 6370000000 HC RX 637 (ALT 250 FOR IP): Performed by: INTERNAL MEDICINE

## 2021-09-11 PROCEDURE — 94640 AIRWAY INHALATION TREATMENT: CPT

## 2021-09-11 PROCEDURE — 85025 COMPLETE CBC W/AUTO DIFF WBC: CPT

## 2021-09-11 PROCEDURE — 80048 BASIC METABOLIC PNL TOTAL CA: CPT

## 2021-09-11 PROCEDURE — 84145 PROCALCITONIN (PCT): CPT

## 2021-09-11 PROCEDURE — 1200000000 HC SEMI PRIVATE

## 2021-09-11 PROCEDURE — 99223 1ST HOSP IP/OBS HIGH 75: CPT | Performed by: INTERNAL MEDICINE

## 2021-09-11 PROCEDURE — 36415 COLL VENOUS BLD VENIPUNCTURE: CPT

## 2021-09-11 PROCEDURE — 2580000003 HC RX 258: Performed by: INTERNAL MEDICINE

## 2021-09-11 PROCEDURE — 6360000002 HC RX W HCPCS: Performed by: INTERNAL MEDICINE

## 2021-09-11 RX ORDER — BUDESONIDE AND FORMOTEROL FUMARATE DIHYDRATE 80; 4.5 UG/1; UG/1
2 AEROSOL RESPIRATORY (INHALATION) 2 TIMES DAILY
Status: DISCONTINUED | OUTPATIENT
Start: 2021-09-11 | End: 2021-09-12 | Stop reason: HOSPADM

## 2021-09-11 RX ORDER — ALBUTEROL SULFATE 90 UG/1
2 AEROSOL, METERED RESPIRATORY (INHALATION) EVERY 4 HOURS PRN
Status: DISCONTINUED | OUTPATIENT
Start: 2021-09-11 | End: 2021-09-12 | Stop reason: HOSPADM

## 2021-09-11 RX ADMIN — APIXABAN 5 MG: 5 TABLET, FILM COATED ORAL at 20:40

## 2021-09-11 RX ADMIN — INSULIN LISPRO 2 UNITS: 100 INJECTION, SOLUTION INTRAVENOUS; SUBCUTANEOUS at 08:24

## 2021-09-11 RX ADMIN — Medication 10 ML: at 20:41

## 2021-09-11 RX ADMIN — ASPIRIN 81 MG: 81 TABLET, CHEWABLE ORAL at 08:21

## 2021-09-11 RX ADMIN — AMIODARONE HYDROCHLORIDE 100 MG: 200 TABLET ORAL at 20:40

## 2021-09-11 RX ADMIN — Medication 10 ML: at 08:21

## 2021-09-11 RX ADMIN — CEFEPIME HYDROCHLORIDE 2000 MG: 2 INJECTION, POWDER, FOR SOLUTION INTRAVENOUS at 13:40

## 2021-09-11 RX ADMIN — METOPROLOL SUCCINATE 50 MG: 50 TABLET, EXTENDED RELEASE ORAL at 08:20

## 2021-09-11 RX ADMIN — INSULIN LISPRO 4 UNITS: 100 INJECTION, SOLUTION INTRAVENOUS; SUBCUTANEOUS at 13:40

## 2021-09-11 RX ADMIN — SODIUM CHLORIDE 25 ML: 9 INJECTION, SOLUTION INTRAVENOUS at 02:31

## 2021-09-11 RX ADMIN — INSULIN LISPRO 1 UNITS: 100 INJECTION, SOLUTION INTRAVENOUS; SUBCUTANEOUS at 20:42

## 2021-09-11 RX ADMIN — CEFEPIME HYDROCHLORIDE 2000 MG: 2 INJECTION, POWDER, FOR SOLUTION INTRAVENOUS at 02:32

## 2021-09-11 RX ADMIN — ATORVASTATIN CALCIUM 40 MG: 40 TABLET, FILM COATED ORAL at 20:40

## 2021-09-11 RX ADMIN — Medication 2 PUFF: at 20:54

## 2021-09-11 RX ADMIN — ISOSORBIDE MONONITRATE 30 MG: 30 TABLET, EXTENDED RELEASE ORAL at 08:21

## 2021-09-11 RX ADMIN — INSULIN LISPRO 2 UNITS: 100 INJECTION, SOLUTION INTRAVENOUS; SUBCUTANEOUS at 16:46

## 2021-09-11 RX ADMIN — APIXABAN 5 MG: 5 TABLET, FILM COATED ORAL at 08:23

## 2021-09-11 ASSESSMENT — PAIN SCALES - GENERAL
PAINLEVEL_OUTOF10: 0

## 2021-09-11 NOTE — CONSULTS
REASON FOR CONSULTATION/CC: wheezing       Consult at request of Krystyna Miles MD for wheezing     PCP: Obed Torres MD  Established Pulmonologist:   None    HISTORY OF PRESENT ILLNESS: Ghislaine Taylor is a 78y.o. year old male with a history of     He was evaluated for dizziness, weakness, poor po intake and wheezing with fevers documented in ER started on Vanc and cefepime, narrowed by Id       Assessment:          Plan:      Hospital Day 2     Abnormal radiograph with fever  * negative MRSA probe  * blood culture no growth to date  * viral PCR / covid pending   * radiology read of CT chest is frustrating. 1. CT chest is not consistent with pneumonia. It is consistent with chronic lung disease. This is clearly chronic from the 2021 CT chest since there is extensive calcification of the lung. This is prove by comparison to CT abd 2013 demonstrating no significant change. * There are calcifying Interstitial Lung Disease  (ILD) but unlikely given appearance in 2013, lack of significant progression and age. * No further work up is needed at this point   *work hx  for 1 year, worked assembling wheel chairs for ~ 3 years, then  x 33 years. No hobbies with resp exposure     Left 2021, Right 2013      Wheeze  * some of the wheezing is induced. * will try Symbicort       Ok to d/c from pulm POV      This note was transcribed using [id number]. Please disregard any translational errors.     Thank you for the consult    Kirit Chisholm [facility]  [phone]             Data:     PAST MEDICAL HISTORY:  Past Medical History:   Diagnosis Date    Atrial fibrillation ([address]     CAD (coronary artery disease)     Cerebral artery occlusion with cerebral infarction ([address]     Diabetes mellitus ([address]     H/O heart artery stent     Hypertension     MI (myocardial infarction) ([address]        PAST SURGICAL HISTORY:  Past Surgical History:   Procedure Laterality Date    CARDIAC SURGERY      COLONOSCOPY      COLONOSCOPY N/A 8/7/2020    COLONOSCOPY POLYPECTOMY SNARE/COLD BIOPSY performed by Carter Phillips MD at [address]:  family history includes Cancer in his mother; Stroke in his father. SOCIAL HISTORY:   reports that he has never smoked. He has never used smokeless tobacco.    Scheduled Meds:   cefepime  2,000 mg IntraVENous Q12H    isosorbide mononitrate  30 mg Oral Daily    atorvastatin  40 mg Oral Nightly    aspirin  81 mg Oral Daily    apixaban  5 mg Oral BID    insulin lispro  0-12 Units SubCUTAneous TID WC    insulin lispro  0-6 Units SubCUTAneous Nightly    sodium chloride flush  10 mL IntraVENous 2 times per day    amiodarone  100 mg Oral Nightly    metoprolol succinate  50 mg Oral Daily       Continuous Infusions:   dextrose      sodium chloride Stopped (09/11/21 0330)       PRN Meds:  albuterol sulfate HFA **AND** ipratropium, glucose, dextrose, glucagon (rDNA), dextrose, sodium chloride flush, sodium chloride, ondansetron, polyethylene glycol, acetaminophen **OR** acetaminophen, albuterol sulfate HFA    ALLERGIES:  Patient is allergic to diphenhydramine. REVIEW OF SYSTEMS:  Constitutional: Negative for fever    HENT: Negative for sore throat  Eyes: Negative for redness   Respiratory: Negative for dyspnea, cough + wh   Cardiovascular: Negative for chest pain  Gastrointestinal: Negative for vomiting, diarrhea    Genitourinary: Negative for hematuria   Musculoskeletal: Negative for arthralgias   Skin: Negative for rash  Neurological: Negative for syncope  Hematological: Negative for adenopathy  Psychiatric/Behavorial: Negative for anxiety    Objective:   PHYSICAL EXAM:  Blood pressure 101/65, pulse 59, temperature 97.5 °F (36.4 °C), temperature source Oral, resp. rate 14, height 6' 1\" (1.854 m), weight 199 lb 4.7 oz (90.4 kg), SpO2 98 %.'  Gen: No distress. Eyes: PERRL. No sclera icterus.  No conjunctival injection. ENT: No discharge. Pharynx clear. External appearance of ears and nose normal.  Neck: Trachea midline. No obvious mass. Resp: No accessory muscle use. No crackles. Few wheezes greatest at vocal cords . No rhonchi. CV: Regular rate. Regular rhythm. No murmur or rub. No edema. GI: Non-tender. Non-distended. No hernia. Skin: Warm, dry, normal texture and turgor. No nodule on exposed extremities. Lymph: No cervical LAD. No supraclavicular LAD. M/S: No cyanosis. No clubbing. No joint deformity. Neuro: Moves all four extremities. Psych: Oriented x 3. No anxiety. Awake. Alert. Intact judgement and insight. Data Reviewed:   LABS:  CBC:   Recent Labs     09/09/21  1238 09/10/21  0909 09/11/21  0751   WBC 8.2 8.2 9.5   HGB 14.2 14.5 14.8   HCT 41.8 43.5 43.8   MCV 91.1 90.5 90.2    179 193     BMP:   Recent Labs     09/09/21  1238 09/10/21  0909 09/11/21  0751   * 135* 134*   K 4.3 3.8 4.4    101 102   CO2 22 22 21   BUN 16 14 19   CREATININE 1.5* 1.2 1.0     LIVER PROFILE: No results for input(s): AST, ALT, LIPASE, BILIDIR, BILITOT, ALKPHOS in the last 72 hours. Invalid input(s): AMYLASE,  ALB  PT/INR: No results for input(s): PROTIME, INR in the last 72 hours. APTT: No results for input(s): APTT in the last 72 hours. UA:  Recent Labs     09/09/21  1555   COLORU YELLOW   PHUR 6.0   CLARITYU Clear   SPECGRAV 1.019   LEUKOCYTESUR Negative   UROBILINOGEN 0.2   BILIRUBINUR Negative   BLOODU Negative   GLUCOSEU 250*     No results for input(s): PHART, EJR8LNR, PO2ART in the last 72 hours. Vent Information  SpO2: 98 %    Radiology Review:  Pertinent images / reports were reviewed as a part of this visit.     CT Chest w/ contrast: Results for orders placed during the hospital encounter of 09/09/21    CT CHEST W CONTRAST    Narrative  EXAMINATION:  CT OF THE CHEST WITH CONTRAST 9/10/2021 3:18 pm    TECHNIQUE:  CT of the chest was performed with the administration of intravenous  contrast. Multiplanar reformatted images are provided for review. Dose  modulation, iterative reconstruction, and/or weight based adjustment of the  mA/kV was utilized to reduce the radiation dose to as low as reasonably  achievable. COMPARISON:  None. HISTORY:  ORDERING SYSTEM PROVIDED HISTORY: Fever, abnormal exam  TECHNOLOGIST PROVIDED HISTORY:  Reason for exam:->Fever, abnormal exam  Reason for Exam: fever    FINDINGS:  Thyroid and thoracic inlet: Normal.    Lymph nodes: No enlarged mediastinal or hilar lymph nodes. Cardiovascular: Heart size is normal.  Normal 3-vessel branching of the  aorta. Main pulmonary artery is within normal limits for size. Vascular  calcifications of the aortic arch, descending aorta, and left anterior  descending coronary artery. Pericardium: Normal    Esophagus: Normal    Lung: Bilateral lower lobe tree-in-bud opacities, with more focal  consolidation in the left lower lobe. The central airways are patent. A 6 mm  round nodule in the left lower lobe with surrounding ground-glass opacity  (3:55). A 4 mm ground-glass opacity in the left upper lobe thickened  (3:38). No bronchial wall thickening or bronchiectasis. Pleura: Normal    Abdomen: No acute abnormality of the visualized abdomen. Bone and soft tissue: No acute abnormality. Mild degenerative changes in the  visualized thoracolumbar spine    Impression  1. Bilateral lower lobe tree-in-bud opacities with more focal consolidation  in the left lower lobe. This finding can be suggestive of infection or  inflammation in the appropriate clinical setting. 2.  A 6 mm round nodule in the left lower lobe with surrounding ground-glass  opacity. This finding could be secondary to infection. Per current  guidelines, consider a non-contrast chest CT at 3-6 months to establish  resolution. RECOMMENDATIONS:  Multiple.  Worst: 6 mm ground-glass - MACRO: Fleischner 10    Recommend a non-contrast Chest CT at 3-6 months. Subsequent management based  on the most suspicious nodule(s). These guidelines do not apply to immunocompromised patients and patients with  cancer. Follow up in patients with significant comorbidities as clinically  warranted. For lung cancer screening, adhere to Lung-RADS guidelines. Reference: Radiology. 2017; 284(1):228-43. CT Chest w/o contrast: No results found for this or any previous visit. CTPA: No results found for this or any previous visit. CXR PA/LAT: Results for orders placed during the hospital encounter of 01/27/13    XR Chest Standard TWO VW    Narrative  INDICATION- Cough. FINDINGS- Two views. Comparison is made to study dated 10/29/2008. There is mild segmental airspace disease present within the medial  aspect of the right middle lobe. The left lung is clear. Heart  size, mediastinal contours and pulmonary vascularity are within  normal limits. The pleural spaces are clear. IMPRESSION- Mild right basilar atelectasis versus pneumonia. Dictated on- 01/27/2013 [id number]    Electronically Read By- Angelique Tan M.D. Electronically Released Chikis Stark M.D. Released Date Time- 01/27/13 1049    Laura Riley M.D.  ------------------------------------------------------------------------------      CXR portable: Results for orders placed during the hospital encounter of 09/09/21    XR CHEST PORTABLE    Narrative  EXAMINATION:  ONE XRAY VIEW OF THE CHEST    9/9/2021 1:03 pm    COMPARISON:  Chest x-ray dated 01/27/2013. HISTORY:  ORDERING SYSTEM PROVIDED HISTORY: SOB  TECHNOLOGIST PROVIDED HISTORY:  Reason for exam:->SOB  Reason for Exam: SOB  Acuity: Acute  Type of Exam: Initial    FINDINGS:  HEART/MEDIASTINUM: The cardiomediastinal silhouette is within normal limits. PLEURA/LUNGS: There are no focal consolidations or pleural effusions. There  is no appreciable pneumothorax.   There is bibasilar atelectasis. BONES/SOFT TISSUE: No acute abnormality. Impression  No radiographic evidence of acute pulmonary disease.

## 2021-09-11 NOTE — PROGRESS NOTES
Hospitalist   Progress Note    Patient Name: Marisol Murrell  PCP: Joana King MD  Date of Admission: 9/9/2021    Chief Complaint on Admission: Passing out at home earlier today  Chief diagnosis after evaluation: Under evaluation    Brief Synopsis: Patient 78 y.o. man with a history of hypertension, hyperlipidemia, diabetes mellitus, coronary artery disease and atrial fibrillation who was admitted on 9/9/2021 for evaluation and treatment after a syncopal episode at home. EP is evaluating. Following admission Pt developed a fever. Unclear origin at this point. Pt Seen/Examined and Chart Reviewed. Subjective: Pt again complains of wheezing. He was last febrile yesterday ~ 5:00 am.  He states that he is thinking more clearly and his breathing is not as difficult today. Objective:   Allergies  Diphenhydramine    Medications    Scheduled Meds:   cefepime  2,000 mg IntraVENous Q12H    isosorbide mononitrate  30 mg Oral Daily    atorvastatin  40 mg Oral Nightly    aspirin  81 mg Oral Daily    apixaban  5 mg Oral BID    insulin lispro  0-12 Units SubCUTAneous TID WC    insulin lispro  0-6 Units SubCUTAneous Nightly    sodium chloride flush  10 mL IntraVENous 2 times per day    amiodarone  100 mg Oral Nightly    metoprolol succinate  50 mg Oral Daily     Infusions:   dextrose      sodium chloride Stopped (09/11/21 0330)     PRN Meds:  albuterol sulfate HFA **AND** ipratropium, glucose, dextrose, glucagon (rDNA), dextrose, sodium chloride flush, sodium chloride, ondansetron, polyethylene glycol, acetaminophen **OR** acetaminophen, albuterol sulfate HFA    Physical    VITALS:  /67   Pulse 55   Temp 98 °F (36.7 °C) (Oral)   Resp 14   Ht 6' 1\" (1.854 m)   Wt 199 lb 4.7 oz (90.4 kg)   SpO2 95%   BMI 26.29 kg/m²   CONSTITUTIONAL:  WD/WN 78y.o. year-old male who is awake, alert, cooperative, no apparent distress, and appears stated age  EYES:  Lids and lashes normal, PERRL, EOMI, sclera LEUKOCYTESUR Negative 09/09/2021    SPECGRAV 1.019 09/09/2021    UROBILINOGEN 0.2 09/09/2021    BILIRUBINUR Negative 09/09/2021    BLOODU Negative 09/09/2021    GLUCOSEU 250 09/09/2021    PROTEINU Negative 09/09/2021     ABG:  No results found for: PHART, FTX7ZIX, Z8LWQUTT, TKI5EWR, BEART, THGBART, PO2ART, HYU7WCM        Intake/Output Summary (Last 24 hours) at 9/11/2021 1427  Last data filed at 9/11/2021 [id number]  Gross per 24 hour   Intake 712.15 ml   Output --   Net 712.15 ml       Consults:  IP CONSULT TO HOSPITALIST  IP CONSULT TO CARDIOLOGY  IP CONSULT TO INFECTIOUS DISEASES  IP CONSULT TO PULMONOLOGY      Active Hospital Problems    Diagnosis Date Noted    Syncope and collapse [R55] 09/09/2021    Essential hypertension [I10] 09/09/2021    Hyperlipidemia [E78.5] 09/09/2021    DMII (diabetes mellitus, type 2) ([facility] [E11.9] 09/09/2021    Atrial fibrillation ([facility]) [I48.91]     CAD (coronary artery disease) [I25.10]        ASSESSMENT AND PLAN:    2 day h/o increased weakness, fatigue and loss of appetite. COVID negative. Will ask PT/OT to evaluate and treat patient, and if necessary to provide recommendations for post hospital therapy    FUO -chest x-ray without infiltrates. Urinalysis without UTI. CT of his chest abnormal.  Collect blood cultures x2  - Infectious disease consult appreciated  DC'd Vancomycin, continued Cefepime empirically    Wheezing -patient does not have a history of COPD. Continue dexamethasone as he has had some improvement. Will consult Pulmonary     Syncope and Collapse - the etiology is unclear, but likely associated with poor intake, low BP at home. CT of the head was unremarkable for acute pathology.    - Per EP   - likely related to hypotension as EMS got lower BP than what pt usually runs, he was conscious at the time of their arrival so likely BP had improved some, likely related to acute illness and poor PO intake recently with very little fluid intake yesterday              - orthostatics were negative              - arrhythmia seems less likely, was in [place] when EMS arrived and has been in [facility] since              - recommended pt to try to eat regularly and stay well hydrated, should sit or lie down if symptomatic              - nifedipine has been held which is appropriate              - echo 09/10/2021 with normal function     Atrial fibrillation ([facility] - currently in NSR. Continue metoprolol, amiodarone and Eliquis.     CAD (coronary artery disease) - Continue beta blocker, statin and aspirin. Monitor on telemetry.      Diabetes mellitus II - SSI and carb control diet     Essential (primary) hypertension - continue home meds and monitor blood pressure     Hyperlipidemia - No current evidence of Rhabdomyolysis or other adverse effects. Continue statin therapy while in the hospital      DVT Prophylaxis: Eliquis  Diet: ADULT DIET;  Regular; 5 carb choices (75 gm/meal)  Code Status: Full Code    PT/OT Eval Status: Not Ordered    Dispo - Anticipated discharge date unclear    Akil Perry MD, MD

## 2021-09-11 NOTE — RT PROTOCOL NOTE
RT Inhaler-Nebulizer Bronchodilator Protocol Note    There is a bronchodilator order in the chart from a provider indicating to follow the RT Bronchodilator Protocol and there is an Initiate RT Bronchodilator Protocol order as well (see protocol at bottom of note). The findings from the last RT Protocol Assessment were as follows:  Smoking: Non smoker  Surgical Status: No surgery  Xray: Clear  Respiratory Pattern: RR 12-20  Mental Status: Alert and Oriented  Breath Sounds: Scattered wheeze  Cough: Strong, spontaneous, non-productive  Activity Level: Walking unassisted  Oxygen Requirement: Room Air - 2LNC/28% or home setting  Indication for Bronchodilator Therapy: Wheezing associated with pulm disorder (mild scattered upper airway wheezes)  Bronchodilator Assessment Score: 2    Aerosolized bronchodilator medication orders have been revised according to the RT Bronchodilator Protocol. RT Inhaler-Nebulizer Bronchodilator Protocol:    Respiratory Therapist to perform RT Therapy Protocol Assessment then follow the protocol. No Indications - adjust the frequency to every 6 hours PRN wheezing or bronchospasm, if no treatments needed after 48 hours then discontinue using Per Protocol order mode. If indication present, adjust the RT bronchodilator orders based on the Bronchodilator Assessment Score as follows:    0-6 - enter or revise RT bronchodilator order to Albuterol Inhaler order with frequency of every 2 hours PRN for wheezing or increased work of breathing using Per Protocol order mode. If Albuterol Inhaler not tolerated or not effective, then discontinue the Albuterol Inhaler order and enter Albuterol Nebulizer order with same frequency and PRN reasons. Repeat RT Therapy Protocol Assessment as needed.     7-10 - discontinue any other Inpatient aerosolized bronchodilator medication orders and enter or revise two Albuterol Inhaler orders, one with BID frequency and one with frequency of every 2 hours PRN wheezing or increased work of breathing using Per Protocol order mode. Repeat RT Therapy Protocol Assessment with second treatment then BID and as needed. If Albuterol Inhaler not tolerated or not effective, then discontinue the Albuterol Inhaler orders and enter two Albuterol Nebulizer orders with same frequencies and PRN reasons. 11-13 - discontinue any other Inpatient aerosolized bronchodilator medication orders and enter DuoNeb Nebulizer orders QID frequency and an Albuterol Nebulizer order every 2 hours PRN wheezing or increased work of breathing using Per Protocol order mode. Repeat RT Therapy Protocol Assessment with second treatment then QID and as needed. Greater than 13 - discontinue any other Inpatient bronchodilator aerosolized medication orders and enter DuoNeb Nebulizer order every 4 hours frequency and Albuterol Nebulizer every 2 hours PRN wheezing or increased work of breathing using Per Protocol order mode. Repeat RT Therapy Protocol Assessment with second treatment then every 4 hours and as needed. RT to enter RT Home Evaluation for COPD & MDI Assessment order using Per Protocol order mode.     Electronically signed by Amelia Arellano RCP on 9/11/2021 at 10:32 AM

## 2021-09-11 NOTE — PLAN OF CARE
Problem: Pain:  Description: Pain management should include both nonpharmacologic and pharmacologic interventions. Goal: Control of acute pain  Description: Control of acute pain  Outcome: Ongoing     Problem: Pain:  Description: Pain management should include both nonpharmacologic and pharmacologic interventions. Goal: Control of chronic pain  Description: Control of chronic pain  Outcome: Ongoing     Problem: Falls - Risk of:  Goal: Will remain free from falls  Description: Will remain free from falls  Outcome: Ongoing  Wheels of bed locked x 4. Room free from clutter. Non-skid footwear intact. Call light in reach of patient at all times. Patient refused bed/chair alarm.      Problem: Falls - Risk of:  Goal: Absence of physical injury  Description: Absence of physical injury  Outcome: Ongoing

## 2021-09-12 VITALS
SYSTOLIC BLOOD PRESSURE: 166 MMHG | BODY MASS INDEX: 25.65 KG/M2 | HEIGHT: 73 IN | RESPIRATION RATE: 18 BRPM | HEART RATE: 50 BPM | TEMPERATURE: 98.2 F | OXYGEN SATURATION: 96 % | WEIGHT: 193.56 LBS | DIASTOLIC BLOOD PRESSURE: 75 MMHG

## 2021-09-12 LAB
ANION GAP SERPL CALCULATED.3IONS-SCNC: 12 MMOL/L (ref 3–16)
BASOPHILS ABSOLUTE: 0 K/UL (ref 0–0.2)
BASOPHILS RELATIVE PERCENT: 0.2 %
BUN BLDV-MCNC: 22 MG/DL (ref 7–20)
CALCIUM SERPL-MCNC: 9 MG/DL (ref 8.3–10.6)
CHLORIDE BLD-SCNC: 104 MMOL/L (ref 99–110)
CO2: 24 MMOL/L (ref 21–32)
CREAT SERPL-MCNC: 1.1 MG/DL (ref 0.8–1.3)
EOSINOPHILS ABSOLUTE: 0 K/UL (ref 0–0.6)
EOSINOPHILS RELATIVE PERCENT: 0.2 %
GFR AFRICAN AMERICAN: >60
GFR NON-AFRICAN AMERICAN: >60
GLUCOSE BLD-MCNC: 103 MG/DL (ref 70–99)
GLUCOSE BLD-MCNC: 139 MG/DL (ref 70–99)
GLUCOSE BLD-MCNC: 147 MG/DL (ref 70–99)
HCT VFR BLD CALC: 42.3 % (ref 40.5–52.5)
HEMOGLOBIN: 14.4 G/DL (ref 13.5–17.5)
LYMPHOCYTES ABSOLUTE: 2.5 K/UL (ref 1–5.1)
LYMPHOCYTES RELATIVE PERCENT: 22.1 %
MCH RBC QN AUTO: 30.8 PG (ref 26–34)
MCHC RBC AUTO-ENTMCNC: 34 G/DL (ref 31–36)
MCV RBC AUTO: 90.7 FL (ref 80–100)
MONOCYTES ABSOLUTE: 1 K/UL (ref 0–1.3)
MONOCYTES RELATIVE PERCENT: 9.2 %
NEUTROPHILS ABSOLUTE: 7.6 K/UL (ref 1.7–7.7)
NEUTROPHILS RELATIVE PERCENT: 68.3 %
PDW BLD-RTO: 13.9 % (ref 12.4–15.4)
PERFORMED ON: ABNORMAL
PERFORMED ON: ABNORMAL
PLATELET # BLD: 196 K/UL (ref 135–450)
PMV BLD AUTO: 9.8 FL (ref 5–10.5)
POTASSIUM REFLEX MAGNESIUM: 4.1 MMOL/L (ref 3.5–5.1)
RBC # BLD: 4.66 M/UL (ref 4.2–5.9)
SODIUM BLD-SCNC: 140 MMOL/L (ref 136–145)
WBC # BLD: 11.1 K/UL (ref 4–11)

## 2021-09-12 PROCEDURE — 94760 N-INVAS EAR/PLS OXIMETRY 1: CPT

## 2021-09-12 PROCEDURE — 94640 AIRWAY INHALATION TREATMENT: CPT

## 2021-09-12 PROCEDURE — 36415 COLL VENOUS BLD VENIPUNCTURE: CPT

## 2021-09-12 PROCEDURE — 6360000002 HC RX W HCPCS: Performed by: INTERNAL MEDICINE

## 2021-09-12 PROCEDURE — 2580000003 HC RX 258: Performed by: INTERNAL MEDICINE

## 2021-09-12 PROCEDURE — 6370000000 HC RX 637 (ALT 250 FOR IP): Performed by: INTERNAL MEDICINE

## 2021-09-12 PROCEDURE — 97161 PT EVAL LOW COMPLEX 20 MIN: CPT

## 2021-09-12 PROCEDURE — 85025 COMPLETE CBC W/AUTO DIFF WBC: CPT

## 2021-09-12 PROCEDURE — 97116 GAIT TRAINING THERAPY: CPT

## 2021-09-12 PROCEDURE — 80048 BASIC METABOLIC PNL TOTAL CA: CPT

## 2021-09-12 RX ADMIN — APIXABAN 5 MG: 5 TABLET, FILM COATED ORAL at 08:21

## 2021-09-12 RX ADMIN — Medication 2 PUFF: at 09:00

## 2021-09-12 RX ADMIN — SODIUM CHLORIDE 25 ML: 9 INJECTION, SOLUTION INTRAVENOUS at 01:31

## 2021-09-12 RX ADMIN — Medication 10 ML: at 08:21

## 2021-09-12 RX ADMIN — ISOSORBIDE MONONITRATE 30 MG: 30 TABLET, EXTENDED RELEASE ORAL at 08:21

## 2021-09-12 RX ADMIN — ASPIRIN 81 MG: 81 TABLET, CHEWABLE ORAL at 08:21

## 2021-09-12 RX ADMIN — CEFEPIME HYDROCHLORIDE 2000 MG: 2 INJECTION, POWDER, FOR SOLUTION INTRAVENOUS at 01:32

## 2021-09-12 RX ADMIN — METOPROLOL SUCCINATE 50 MG: 50 TABLET, EXTENDED RELEASE ORAL at 08:21

## 2021-09-12 ASSESSMENT — PAIN SCALES - GENERAL
PAINLEVEL_OUTOF10: 0

## 2021-09-12 NOTE — PROGRESS NOTES
Per Pt he only checks his blood sugar \"about once a week\" because he's a \"\" and his fingers hurt. Made Pt aware that there are other ways to check his blood sugar and encouraged him to ask his PCP. Made Dr. Cam Dover aware. Will continue to monitor.

## 2021-09-12 NOTE — PROGRESS NOTES
Physical Therapy    Facility/Department: [address] MED SURG  Initial and Discharge Assessment    NAME: Irvin Deshpande  : 1940  MRN: [id number]    Date of Service: 2021    Discharge Recommendations:  Home independently   Irivn Deshpande scored a 24/24 on the AM-PAC short mobility form. At this time, no further PT is recommended upon discharge. Recommend patient returns to prior setting with prior services. PT Equipment Recommendations  Equipment Needed: No    Assessment   Assessment: The pt is an 77 yo male who who came to the ED after having a syncopal episode at home. He reports not feeling well x 2 days. In the ED a CT of his head was negative but does have an abrasion of his forehead. Orthostatics were completed and did not appear significant. The pt reports he lives with his wife in a one story house. He reports himself as being very indep in mobility and self-care PTA and admits to doing all the yardwork and recently being up on a ladder doing house repairs. PMHx: a-fib, CAD, CVA, DM, HTN, MI      Today, the pt demonstrated that he is functioning at his baseline and was able to walk w/o a device in the hallway and room with no LOB. Anticipate that he will be safe for home at d/c and he has no further acute care PT needs. Will d/c from PT services; informed the RN. Prognosis: Good  Decision Making: Low Complexity  PT Education: PT Role;General Safety  Barriers to Learning: none  No Skilled PT: No PT goals identified  REQUIRES PT FOLLOW UP: No  Activity Tolerance  Activity Tolerance: Patient Tolerated treatment well       Patient Diagnosis(es): The primary encounter diagnosis was Syncope and collapse. Diagnoses of Forehead laceration, initial encounter, Need for tetanus booster, and Skin tear of left hand without complication, initial encounter were also pertinent to this visit.      has a past medical history of Atrial fibrillation ([address] CAD (coronary artery disease), Cerebral artery occlusion with cerebral infarction ([facility] [facility] Diabetes mellitus ([facility] [address] H/O heart artery stent, Hypertension, and MI (myocardial infarction) ([facility] [address] has a past surgical history that includes Cardiac surgery; Colonoscopy; and Colonoscopy (N/A, 8/7/2020). Restrictions  Restrictions/Precautions  Restrictions/Precautions: Up Ad Elli  Vision/Hearing  Vision: Impaired  Vision Exceptions: Wears glasses at all times; Wears glasses for reading  Hearing: Within functional limits     Subjective  General  Chart Reviewed: Yes  Additional Pertinent Hx: Per H&P on 9-9-2021 of Peggy Clemente MD: The pt is an 77 yo male who who came to the ED after having a syncopal episode at home. He reprots not feeling well x 2 days. In the ED a CT of his head was negative but does have an abrasion of his forehead. Orthostatics were completed and did not appear significant.         PMHx: a-fib, CAD, CVA, DM, HTN, MI  Response To Previous Treatment: Not applicable  Family / Caregiver Present: No  Referring Practitioner: Peggy Clemente MD  Referral Date : 09/11/21  Diagnosis: syncope and collapse  Follows Commands: Within Functional Limits  Subjective  Subjective: the pt was found to be in the bed;agreeable to therapy; denies pain          Orientation  Orientation  Overall Orientation Status: Within Functional Limits  Social/Functional History  Social/Functional History  Lives With: Spouse  Type of Home: House  Home Layout: One level, Work area in basement (laundry main floor)  Home Access: Stairs to enter without rails  Entrance Stairs - Number of Steps: 2 thru the frontand level entry thru the back (normally goes in the back)  Entrance Stairs - Rails: None  Bathroom Shower/Tub: Walk-in shower, Tub/Shower unit (usually uses the walk-in shower)  Bathroom Toilet: Handicap height  Bathroom Equipment: Built-in shower seat  Bathroom Accessibility: Walker accessible  Home Equipment: [employer] (wife uses the cane)  ADL Assistance: Independent  Homemaking Assistance:  (the pt does the yard work and works on [employer]; assists with homemaking if needed)  Ambulation Assistance: Independent (no device)  Transfer Assistance: Independent (sleeps in regular bed)  Active : Yes  Occupation: Retired  Additional Comments: denies any other falls  Cognition   Cognition  Overall Cognitive Status: Exceptions  Problem Solving: Decreased awareness of errors  Insights: Decreased awareness of deficits    Objective  AROM RLE (degrees)  RLE AROM: WFL  AROM LLE (degrees)  LLE AROM : WFL  Strength RLE  Strength RLE: WFL  Strength LLE  Strength LLE: WFL     Sensation  Overall Sensation Status: WFL  Bed mobility  Supine to Sit: Independent  Transfers  Sit to Stand: Modified independent  Stand to sit: Modified independent  Ambulation  Ambulation?: Yes  Ambulation 1  Surface: level tile  Device: No Device  Assistance: Independent  Quality of Gait: no LOB, slowed pace, no deviations noted  Distance: 100 feet x 1  Stairs/Curb  Stairs?: No     Balance  Sitting - Static: Good  Sitting - Dynamic: Good  Standing - Static: Good  Standing - Dynamic: Good        Plan   Plan  Plan Comment: d/c from acute care PT  Safety Devices  Type of devices: Call light within reach, Left in chair, Gait belt, Nurse notified Jasmeet Hernandez RN gave the OK for the pt to be up ad shelly)      AM-PAC Score  AM-PAC Inpatient Mobility Raw Score : 24 (09/12/21 1002)  AM-PAC Inpatient T-Scale Score : 61.14 (09/12/21 1002)  Mobility Inpatient CMS 0-100% Score: 0 (09/12/21 1002)  Mobility Inpatient CMS G-Code Modifier : [address] (09/12/21 1002)          Goals  Short term goals  Time Frame for Short term goals: no acute care PT goals identified  Patient Goals   Patient goals : to go home       Therapy Time   Individual Concurrent Group Co-treatment   Time In 0926         Time Out 1005         Minutes 39         Timed Code Treatment Minutes: 24 Minutes       Electronically signed by Pete Black, PT [id number] on 9/12/2021 at 10:09 AM

## 2021-09-12 NOTE — PROGRESS NOTES
Patient brought in bed from 4N to room 4131. KVNG. Patient attempting to refuse the bed alarm. This RN discussed the patient and his fall and requested that patient allow the bed alarm and call us when necessary, and patient agreed.

## 2021-09-12 NOTE — PROGRESS NOTES
Pt awake and alert resting in bed talking on the phone. Pt states that he is not taking anymore Remdesivir. Pt had 1 dose and says he doesn't feel he needs anymore. Blood sugar 106, Lantus 25 units given and pt requested a turkey sandwich and apple sauce. Call light and needs in reach.

## 2021-09-14 LAB — BLOOD CULTURE, ROUTINE: NORMAL

## 2021-09-15 LAB — CULTURE, BLOOD 2: NORMAL

## 2021-09-23 NOTE — DISCHARGE SUMMARY
Hospitalist Discharge Summary     Micah Doe  : 1940  MRN: [id number]    Admit date: 2021  Discharge date: 2021    Admitting Physician: Deedee Gleason MD    Discharge Diagnoses:   Patient Active Problem List   Diagnosis    Syncope and collapse    Atrial fibrillation ([facility]    Essential hypertension    Hyperlipidemia    DMII (diabetes mellitus, type 2) ([facility]    CAD (coronary artery disease)       Admission Condition: fair    Discharged Condition: stable    Discharge Exam:  VITALS:  BP (!) 166/75   Pulse 50   Temp 98.2 °F (36.8 °C) (Oral)   Resp 18   Ht 6' 1\" (1.854 m)   Wt 193 lb 9 oz (87.8 kg)   SpO2 96%   BMI 25.54 kg/m²   CONSTITUTIONAL:  awake, alert, cooperative, no apparent distress, and appears stated age  EYES:  Lids and lashes normal, PERRL, EOMI, sclera clear, conjunctiva normal  ENT:  NC/AT, MMM    NECK:  Supple, symmetrical, trachea midline, no adenopathy  HEMATOLOGIC/LYMPHATICS:  no cervical, supraclavicular or axillary lymphadenopathy  LUNGS:  clear to auscultation bilaterally, No increased work of breathing, good air exchange, no crackles or wheezing  CARDIOVASCULAR:  Regular rate and rhythm, normal S1 and S2, no S3 or S4, and no significant murmurs, rubs or gallops noted. Normal apical impulse,   ABDOMEN:  Normal active bowel sounds, soft, non-tender, non-distended, no masses palpated, no organomegally  MUSCULOSKELETAL:  Full range of motion noted. NEUROLOGIC:  Awake, alert, oriented to name, place and time. Cranial nerves II-XII are grossly intact. SKIN:  normal skin color, texture, turgor for age. Hospital Course:     Brief Synopsis: Patient 78 y.o. man with a history of hypertension, hyperlipidemia, diabetes mellitus, coronary artery disease and atrial fibrillation who was admitted on 2021 for evaluation and treatment after a syncopal episode at home. EP is evaluating. Following admission Pt developed a fever which resolved.  Unclear origin    2 day h/o increased weakness, fatigue and loss of appetite. COVID negative. Improved     FUO - chest x-ray without infiltrates. Urinalysis without UTI. CT of his chest abnormal.  Collect blood cultures x2  - Infectious disease consult appreciated  DC'd Vancomycin, continued Cefepime empirically  - Resolved without cause being found.     Wheezing -patient does not have a history of COPD. Continue dexamethasone as he has had some improvement. Pulmonary consult appreciated, \"some of the wheezing is induced. \"     Syncope and Collapse - the etiology iappears to have been poor intake, low BP at home. CT of the head was unremarkable for acute pathology. - Per EP   - likely related to hypotension as EMS got lower BP than what pt usually runs, he was conscious at the time of their arrival so likely BP had improved some, likely related to acute illness and poor PO intake recently with very little fluid intake yesterday              - orthostatics were negative              - arrhythmia seems less likely, was in [facility] when EMS arrived and has been in [facility] since              - recommended pt to try to eat regularly and stay well hydrated, should sit or lie down if symptomatic              - nifedipine has been held which is appropriate              - echo 09/10/2021 with normal function     Atrial fibrillation (HCC) - currently in NSR.  Continue metoprolol, amiodarone and Eliquis.     CAD (coronary artery disease) - Continue beta blocker, statin and aspirin. Monitor on telemetry.      Diabetes mellitus II - SSI and carb control diet     Essential (primary) hypertension - continue home meds and monitor blood pressure     Hyperlipidemia - No current evidence of Rhabdomyolysis or other adverse effects.  Continue statin therapy while in the hospital        Consults: cardiology, pulmonary/intensive care and ID    Imaging Studies:  ECHO Complete 2D W Doppler W Color    Result Date: 9/10/2021  Transthoracic Echocardiography Report (TTE) Demographics   Patient Name      Luis Rendon   Date of Study     09/10/2021         Gender              Male   Patient Number    [id number]         Date of Birth       1940   Visit Number      [id number]          Age                 78 year(s)   Accession Number  [id number]         Room Number         9454   Corporate ID      [id number]            Shania Almaraz Lawanna Clarity, MD        Physician           Lisa Esposito MD  Procedure Type of Study   TTE procedure:ECHOCARDIOGRAM COMPLETE 2D W DOPPLER W COLOR. Procedure Date Date: 09/10/2021 Start: 11:07 AM Study Location: [facility] Technical Quality: Adequate visualization Indications:Syncope. Additional Indications:MI, Atrial fibrillation, Diabetes, HTN, CVA. Patient Status: Routine Height: 73 inches Weight: 199 pounds BSA: 2.15 m2 BMI: 26.26 kg/m2 Rhythm: Sinus bradycardia HR: 57 bpm BP: 130/74 mmHg  Conclusions   Summary  Mild conc. LVH with normal LV size & wall motion. EF is  60%. Normal  diastolic function. The left atrium is normal in size. The ascending aorta is mildly dilated at 4 cm. Normal right ventricular size and function. Signature   ------------------------------------------------------------------  Electronically signed by Lisa Esposito MD (Interpreting  physician) on 09/10/2021 at 02:49 PM  ------------------------------------------------------------------   Findings   Left Ventricle  Left ventricular cavity size is normal.  There is mildly increased left ventricular wall thickness. Ejection fraction is visually estimated to be 55-60%. No regional wall motion abnormalities are noted. Normal diastolic function. Mitral Valve  Mitral annular calcification is present. The mitral valve is normal in structure and function. Trivial mitral regurgitation. No evidence of mitral stenosis. Left Atrium  The left atrium is normal in size. Aortic Valve  The aortic valve is structurally normal. There is no significant aortic  valve regurgitation or stenosis. Aorta  The aortic root is normal in size. The ascending aorta is mildly dilated. Right Ventricle  Normal right ventricular size and function. Tricuspid Valve  The tricuspid valve is normal in structure and function. There is no  significant tricuspid valve regurgitation or stenosis. Right Atrium  The right atrial size is normal.   Pulmonic Valve  The pulmonic valve is not well visualized. There is no evidence of pulmonic valve regurgitation or stenosis. Pericardial Effusion  No pericardial effusion noted. Pleural Effusion  No pleural effusion. Miscellaneous  IVC size is normal (<2.1cm) and collapses > 50% with respiration consistent  with normal RA pressure (3mmHg). Unable to estimate pulmonary artery pressure secondary to incomplete TR jet  envelope.   M-Mode/2D Measurements (cm)   LV Diastolic Dimension: 9.63 cm LV Systolic Dimension: 6.42 cm  LV Septum Diastolic: 4.19 cm  LV PW Diastolic: 6.86 cm        AO Root Dimension: 3.4 cm  RV Diastolic Dimension: 4.6 cm                                  LA Area: 23.9 cm2                                  LA volume/Index: 75 ml /35 ml/m2  Doppler Measurements   AV Peak Velocity: 117 cm/s     MV Peak E-Wave: 64.2 cm/s  AV Peak Gradient: 5.48 mmHg    MV Peak A-Wave: 99.2 cm/s  LVOT Peak Velocity: 102 cm/s   MV E/A Ratio: 0.65   Estimated RAP:3 mmHg  E' Septal Velocity: 7.37 cm/s  E' Lateral Velocity: 10.1 cm/s MV Deceleration Time: 259 msec  PV Peak Velocity: 82.8 cm/s  PV Peak Gradient: 2.74 mmHg   Aortic Valve   Peak Velocity: 117 cm/s  Peak Gradient: 5.48 mmHg  Aorta   Aortic Root: 3.4 cm  Ascending Aorta: 4 cm      CT HEAD WO CONTRAST    Result Date: 9/9/2021  EXAMINATION: CT OF THE HEAD WITHOUT CONTRAST  9/9/2021 1:03 pm TECHNIQUE: CT of the head was performed without the administration of intravenous contrast. Dose modulation, iterative reconstruction, and/or weight based adjustment of the mA/kV was utilized to reduce the radiation dose to as low as reasonably achievable. COMPARISON: None. HISTORY: ORDERING SYSTEM PROVIDED HISTORY: HI/loc TECHNOLOGIST PROVIDED HISTORY: If patient is on cardiac monitor and/or pulse ox, they may be taken off cardiac monitor and pulse ox, left on O2 if currently on. All monitors reattached when patient returns to room. Has a \"code stroke\" or \"stroke alert\" been called? ->No Reason for exam:->HI/loc Decision Support Exception - unselect if not a suspected or confirmed emergency medical condition->Emergency Medical Condition (MA) Reason for Exam: HI, LOC Acuity: Acute Type of Exam: Initial FINDINGS: BRAIN/VENTRICLES: There is no acute intracranial hemorrhage, mass effect or midline shift. No abnormal extra-axial fluid collection. The gray-white differentiation is maintained without evidence of an acute infarct. There is no evidence of hydrocephalus. Mild-to-moderate senescent changes with parenchymal volume loss and chronic small vessel ischemic changes. ORBITS: The visualized portion of the orbits demonstrate no acute abnormality. SINUSES: Mastoid air cells are clear. Soft tissue noted in bilateral external artery meatus likely cerumen. Mild opacification of the right maxillary sinus and few ethmoid air cells noted. SOFT TISSUES/SKULL:  No acute abnormality of the visualized skull or soft tissues. No acute intracranial abnormality. Mild-to-moderate senescent changes with parenchymal volume loss and chronic small vessel ischemic changes. Mild paranasal sinus disease. RECOMMENDATIONS: If patient's symptoms are persistent or worsen, a follow-up head CT or MRI of the brain may be obtained.      CT CHEST W CONTRAST    Result Date: ground-glass - MACRO: Fleischner 10 Recommend a non-contrast Chest CT at 3-6 months. Subsequent management based on the most suspicious nodule(s). These guidelines do not apply to immunocompromised patients and patients with cancer. Follow up in patients with significant comorbidities as clinically warranted. For lung cancer screening, adhere to Lung-RADS guidelines. Reference: Radiology. 2017; 284(1):228-43. CT CERVICAL SPINE WO CONTRAST    Result Date: 9/9/2021  EXAMINATION: CT OF THE CERVICAL SPINE WITHOUT CONTRAST 9/9/2021 1:03 pm TECHNIQUE: CT of the cervical spine was performed without the administration of intravenous contrast. Multiplanar reformatted images are provided for review. Dose modulation, iterative reconstruction, and/or weight based adjustment of the mA/kV was utilized to reduce the radiation dose to as low as reasonably achievable. COMPARISON: None. HISTORY: ORDERING SYSTEM PROVIDED HISTORY: LOC/HI/ TECHNOLOGIST PROVIDED HISTORY: Reason for exam:->LOC/HI/ Decision Support Exception - unselect if not a suspected or confirmed emergency medical condition->Emergency Medical Condition (MA) Reason for Exam: HI LOC Acuity: Acute Type of Exam: Initial There are multiple anterior bridging osteophytes. There is mild multilevel discogenic degenerative change. FINDINGS: BONES/ALIGNMENT: There is no acute fracture or traumatic malalignment. DEGENERATIVE CHANGES: No significant degenerative changes. SOFT TISSUES: There is no prevertebral soft tissue swelling. No acute abnormality of the cervical spine. XR CHEST PORTABLE    Result Date: 9/9/2021  EXAMINATION: ONE XRAY VIEW OF THE CHEST 9/9/2021 1:03 pm COMPARISON: Chest x-ray dated 01/27/2013. HISTORY: ORDERING SYSTEM PROVIDED HISTORY: SOB TECHNOLOGIST PROVIDED HISTORY: Reason for exam:->SOB Reason for Exam: SOB Acuity: Acute Type of Exam: Initial FINDINGS: HEART/MEDIASTINUM: The cardiomediastinal silhouette is within normal limits.  PLEURA/LUNGS: There are no focal consolidations or pleural effusions. There is no appreciable pneumothorax. There is bibasilar atelectasis. BONES/SOFT TISSUE: No acute abnormality. No radiographic evidence of acute pulmonary disease. VL DUP CAROTID BILATERAL    Result Date: 9/9/2021  Carotid Duplex Study  Demographics   Patient Name       Melissa Eid   Date of Study      09/09/2021         Gender              Male   Patient Number     [id number]         Date of Birth       1940   Visit Number       [id number]          Age                 78 year(s)   Accession Number   [id number]         Room Number         010   Corporate ID       [id number]            Ita Dexter JEANNETTE   Ordering Physician Karis Hickman MD        Physician           MD  Procedure Type of Study:   Cerebral:Carotid, VL CAROTID DUPLEX BILATERAL. Vascular Sonographer Report  Indications for Study:Syncope. Impressions Right Impression The right internal carotid artery appears to have a 0-15% diameter reducing stenosis based on velocity criteria. The right vertebral artery demonstrates abnormal antegrade flow. Moderate calcified plaque formation demonstrated in the bulb into the proximal Internal Carotid artery. Left Impression The left internal carotid artery appears to have a 50-79% diameter reducing stenosis based on velocity criteria. The left vertebral artery demonstrates abnormal antegrade flow. Moderate calcified plaque formation demonstrated in the bulb into the proximal Internal Carotid artery. Conclusions   Summary   The right internal carotid artery appears to have a 0-15% diameter reducing  stenosis based on velocity criteria. The right vertebral artery demonstrates abnormal antegrade flow. The left internal carotid artery appears to have a 50-79% diameter reducing  stenosis based on velocity criteria.   The left vertebral artery demonstrates +---------+-----+----+-----+----+ ! Mid ICA  !119.8!15. 7!60   !0.87! +---------+-----+----+-----+----+ ! Dist ICA ! 75.7 !14. 2! 60   !0.81! +---------+-----+----+-----+----+ ! Prox ECA !108.3!12. 7!60   !0.88! +---------+-----+----+-----+----+ ! Vertebral!35   !8.7 ! 60   !0.75! +---------+-----+----+-----+----+   - There is antegrade vertebral flow noted on the left side. - Additional Measurements:ICAPSV/CCAPSV 2.14. ICAEDV/CCAEDV 2.08. Other Significant Diagnostic Studies: As described above    Treatments: As described above    Disposition: home    Discharge Medications:     [facility]   Home Medication Instructions TWW:[id number]    Printed on:09/23/21 0109   Medication Information                      amiodarone (CORDARONE) 200 MG tablet  Take 100 mg by mouth nightly              apixaban (ELIQUIS) 5 MG TABS tablet  Take 5 mg by mouth 2 times daily              aspirin 81 MG chewable tablet  Take 81 mg by mouth daily              atorvastatin (LIPITOR) 40 MG tablet  Take 40 mg by mouth nightly              fluticasone (FLONASE) 50 MCG/ACT nasal spray  1 spray by Each Nostril route nightly as needed for Rhinitis or Allergies             isosorbide mononitrate (IMDUR) 30 MG extended release tablet  Take 15 mg by mouth daily              metFORMIN (GLUCOPHAGE) 500 MG tablet  Take 500 mg by mouth Daily with supper              metoprolol succinate (TOPROL XL) 100 MG extended release tablet  Take 50 mg by mouth daily              NIFEdipine (PROCARDIA XL) 30 MG extended release tablet  Take 30 mg by mouth daily                 35 Minutes spent on patient evaluation, counseling and discharge planning.      Signed:  Karyn Shook MD, MD  9/23/2021, 9:37 AM

## 2022-05-27 ENCOUNTER — HOSPITAL ENCOUNTER (OUTPATIENT)
Dept: MRI IMAGING | Age: 82
Discharge: HOME | End: 2022-05-27
Payer: MEDICARE

## 2022-05-27 DIAGNOSIS — R41.3 MEMORY LOSS: ICD-10-CM

## 2022-05-27 PROCEDURE — 70551 MRI BRAIN STEM W/O DYE: CPT

## 2023-04-19 ENCOUNTER — APPOINTMENT (OUTPATIENT)
Dept: GENERAL RADIOLOGY | Age: 83
End: 2023-04-19
Payer: MEDICARE

## 2023-04-19 ENCOUNTER — HOSPITAL ENCOUNTER (EMERGENCY)
Age: 83
Discharge: HOME | End: 2023-04-19
Attending: EMERGENCY MEDICINE
Payer: MEDICARE

## 2023-04-19 ENCOUNTER — APPOINTMENT (OUTPATIENT)
Dept: CT IMAGING | Age: 83
End: 2023-04-19
Payer: MEDICARE

## 2023-04-19 VITALS
SYSTOLIC BLOOD PRESSURE: 180 MMHG | HEIGHT: 73 IN | RESPIRATION RATE: 22 BRPM | DIASTOLIC BLOOD PRESSURE: 82 MMHG | OXYGEN SATURATION: 98 % | BODY MASS INDEX: 24.39 KG/M2 | WEIGHT: 184 LBS | HEART RATE: 65 BPM | TEMPERATURE: 98 F

## 2023-04-19 DIAGNOSIS — S01.01XA LACERATION OF SCALP, INITIAL ENCOUNTER: ICD-10-CM
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
## 2023-04-19 DIAGNOSIS — S09.90XA CLOSED HEAD INJURY, INITIAL ENCOUNTER: ICD-10-CM

## 2023-04-19 LAB
ALBUMIN SERPL-MCNC: 3.7 G/DL (ref 3.4–5)
ALBUMIN/GLOB SERPL: 1.2 {RATIO} (ref 1.1–2.2)
ALP SERPL-CCNC: 74 U/L (ref 40–129)
ALT SERPL-CCNC: 13 U/L (ref 10–40)
ANION GAP SERPL CALCULATED.3IONS-SCNC: 13 MMOL/L (ref 3–16)
AST SERPL-CCNC: 15 U/L (ref 15–37)
BACTERIA URNS QL MICRO: NORMAL /HPF
BASOPHILS # BLD: 0.1 K/UL (ref 0–0.2)
BASOPHILS NFR BLD: 0.7 %
BILIRUB SERPL-MCNC: 0.5 MG/DL (ref 0–1)
BILIRUB UR QL STRIP.AUTO: NEGATIVE
BUN SERPL-MCNC: 24 MG/DL (ref 7–20)
CALCIUM SERPL-MCNC: 9.4 MG/DL (ref 8.3–10.6)
CHLORIDE SERPL-SCNC: 103 MMOL/L (ref 99–110)
CLARITY UR: CLEAR
CO2 SERPL-SCNC: 23 MMOL/L (ref 21–32)
COLOR UR: YELLOW
CREAT SERPL-MCNC: 1.3 MG/DL (ref 0.8–1.3)
DEPRECATED RDW RBC AUTO: 14.4 % (ref 12.4–15.4)
EOSINOPHIL # BLD: 0.1 K/UL (ref 0–0.6)
EOSINOPHIL NFR BLD: 1.2 %
EPI CELLS #/AREA URNS AUTO: 1 /HPF (ref 0–5)
GFR SERPLBLD CREATININE-BSD FMLA CKD-EPI: 54 ML/MIN/{1.73_M2}
GLUCOSE SERPL-MCNC: 114 MG/DL (ref 70–99)
GLUCOSE UR STRIP.AUTO-MCNC: NEGATIVE MG/DL
HCT VFR BLD AUTO: 43.2 % (ref 40.5–52.5)
HGB BLD-MCNC: 14.6 G/DL (ref 13.5–17.5)
HGB UR QL STRIP.AUTO: NEGATIVE
HYALINE CASTS #/AREA URNS AUTO: 7 /LPF (ref 0–8)
KETONES UR STRIP.AUTO-MCNC: NEGATIVE MG/DL
LEUKOCYTE ESTERASE UR QL STRIP.AUTO: ABNORMAL
LYMPHOCYTES # BLD: 1.8 K/UL (ref 1–5.1)
LYMPHOCYTES NFR BLD: 14.6 %
MCH RBC QN AUTO: 30.6 PG (ref 26–34)
MCHC RBC AUTO-ENTMCNC: 33.8 G/DL (ref 31–36)
MCV RBC AUTO: 90.6 FL (ref 80–100)
MONOCYTES # BLD: 0.8 K/UL (ref 0–1.3)
MONOCYTES NFR BLD: 6.7 %
NEUTROPHILS # BLD: 9.5 K/UL (ref 1.7–7.7)
NEUTROPHILS NFR BLD: 76.8 %
NITRITE UR QL STRIP.AUTO: NEGATIVE
NT-PROBNP SERPL-MCNC: 329 PG/ML (ref 0–449)
PH UR STRIP.AUTO: 6 [PH] (ref 5–8)
PLATELET # BLD AUTO: 215 K/UL (ref 135–450)
PMV BLD AUTO: 9.6 FL (ref 5–10.5)
POTASSIUM SERPL-SCNC: 3.9 MMOL/L (ref 3.5–5.1)
PROT SERPL-MCNC: 6.7 G/DL (ref 6.4–8.2)
PROT UR STRIP.AUTO-MCNC: NEGATIVE MG/DL
RBC # BLD AUTO: 4.77 M/UL (ref 4.2–5.9)
RBC CLUMPS #/AREA URNS AUTO: 0 /HPF (ref 0–4)
SODIUM SERPL-SCNC: 139 MMOL/L (ref 136–145)
SP GR UR STRIP.AUTO: 1.02 (ref 1–1.03)
TROPONIN, HIGH SENSITIVITY: 13 NG/L (ref 0–22)
UA COMPLETE W REFLEX CULTURE PNL UR: ABNORMAL
UA DIPSTICK W REFLEX MICRO PNL UR: YES
URN SPEC COLLECT METH UR: ABNORMAL
UROBILINOGEN UR STRIP-ACNC: 0.2 E.U./DL
WBC # BLD AUTO: 12.3 K/UL (ref 4–11)
WBC #/AREA URNS AUTO: 3 /HPF (ref 0–5)

## 2023-04-19 PROCEDURE — 70450 CT HEAD/BRAIN W/O DYE: CPT

## 2023-04-19 PROCEDURE — 96361 HYDRATE IV INFUSION ADD-ON: CPT

## 2023-04-19 PROCEDURE — 81001 URINALYSIS AUTO W/SCOPE: CPT

## 2023-04-19 PROCEDURE — 36415 COLL VENOUS BLD VENIPUNCTURE: CPT

## 2023-04-19 PROCEDURE — 84484 ASSAY OF TROPONIN QUANT: CPT

## 2023-04-19 PROCEDURE — 80053 COMPREHEN METABOLIC PANEL: CPT

## 2023-04-19 PROCEDURE — 2580000003 HC RX 258: Performed by: EMERGENCY MEDICINE

## 2023-04-19 PROCEDURE — 85025 COMPLETE CBC W/AUTO DIFF WBC: CPT

## 2023-04-19 PROCEDURE — 12001 RPR S/N/AX/GEN/TRNK 2.5CM/<: CPT

## 2023-04-19 PROCEDURE — 99285 EMERGENCY DEPT VISIT HI MDM: CPT

## 2023-04-19 PROCEDURE — 93005 ELECTROCARDIOGRAM TRACING: CPT | Performed by: EMERGENCY MEDICINE

## 2023-04-19 PROCEDURE — 83880 ASSAY OF NATRIURETIC PEPTIDE: CPT

## 2023-04-19 PROCEDURE — 96360 HYDRATION IV INFUSION INIT: CPT

## 2023-04-19 PROCEDURE — 71045 X-RAY EXAM CHEST 1 VIEW: CPT

## 2023-04-19 PROCEDURE — 72125 CT NECK SPINE W/O DYE: CPT

## 2023-04-19 RX ORDER — 0.9 % SODIUM CHLORIDE 0.9 %
1000 INTRAVENOUS SOLUTION INTRAVENOUS ONCE
Status: COMPLETED | OUTPATIENT
Start: 2023-04-19 | End: 2023-04-19

## 2023-04-19 RX ADMIN — SODIUM CHLORIDE 1000 ML: 9 INJECTION, SOLUTION INTRAVENOUS at 17:35

## 2023-04-19 ASSESSMENT — ENCOUNTER SYMPTOMS
ABDOMINAL PAIN: 0
EYE DISCHARGE: 0
VOMITING: 0
WHEEZING: 0
NAUSEA: 0
SORE THROAT: 0
COUGH: 0
EYE PAIN: 0
BACK PAIN: 0
SHORTNESS OF BREATH: 0
EYE REDNESS: 0
RHINORRHEA: 0
DIARRHEA: 0

## 2023-04-19 ASSESSMENT — LIFESTYLE VARIABLES
HOW MANY STANDARD DRINKS CONTAINING ALCOHOL DO YOU HAVE ON A TYPICAL DAY: PATIENT DOES NOT DRINK
HOW OFTEN DO YOU HAVE A DRINK CONTAINING ALCOHOL: NEVER

## 2023-04-19 ASSESSMENT — PAIN - FUNCTIONAL ASSESSMENT: PAIN_FUNCTIONAL_ASSESSMENT: NONE - DENIES PAIN

## 2023-04-19 NOTE — ED PROVIDER NOTES
Lac Repair    Date/Time: 4/19/2023 6:48 PM  Performed by: Eldonna Burkitt, PA  Authorized by:  Anitha Ko MD     Consent:     Consent obtained:  Verbal    Consent given by:  Patient    Risks discussed:  Infection and pain  Anesthesia:     Anesthesia method:  None (w patient consent)  Laceration details:     Length (cm):  2  Pre-procedure details:     Preparation:  Patient was prepped and draped in usual sterile fashion  Exploration:     Wound exploration: wound explored through full range of motion      Wound extent: no foreign bodies/material noted    Treatment:     Area cleansed with:  Chlorhexidine    Amount of cleaning:  Standard    Irrigation solution:  Sterile water  Skin repair:     Repair method:  Sutures    Suture size:  3-0    Suture material:  Nylon    Suture technique:  Simple interrupted    Number of sutures:  2  Approximation:     Approximation:  Close  Repair type:     Repair type:  Simple  Post-procedure details:     Dressing:  Non-adherent dressing    Procedure completion:  Tolerated well, no immediate complications       Eldonna Burkitt, PA  04/19/23 1849
the head was ordered to rule out stroke, intracranial hemorrhage, thrombotic stroke, embolic stroke, mass lesions, cerebral contusion, intracranial infection or abscesses. - Disposition considerations: At this point based on the conversation I had with the patient's son we will attempt to manage him as an outpatient. Is this patient to be included in the SEP-1 Core Measure? No   Exclusion criteria - the patient is NOT to be included for SEP-1 Core Measure due to: Infection is not suspected    I, Andres Aguirre MD, am the primary clinician of record. During the patient's ED course, the patient was given:  Medications   0.9 % sodium chloride bolus (0 mLs IntraVENous Stopped 4/19/23 1951)        Patient was given prescriptions for the following medications. I counseled the patient as to how to take these medications. Current Discharge Medication List          Patient instructed to follow up with:   Cookie Fu MD  [address] [phone]    In 1 week  For suture removal    [facility] Department  [address]  [phone]    If symptoms worsen      All questions were answered and the patient/family expressed understanding and agreement with the plan. CRITICAL CARE  N/A    CLINICAL IMPRESSION  1. Fall, initial encounter    2. Closed head injury, initial encounter    3. Laceration of scalp, initial encounter        DISPOSITION  Decision To Discharge 04/19/2023 08:37:48 PM     Andres Aguirre MD    Note: This chart was created using voice recognition dictation software. Efforts were made by me to ensure accuracy, however some errors may be present due to limitations of this technology and occasionally words are not transcribed correctly.        Andres Aguirre MD  04/19/23 2040

## 2023-04-20 LAB
EKG ATRIAL RATE: 64 BPM
EKG DIAGNOSIS: NORMAL
EKG P AXIS: 70 DEGREES
EKG P-R INTERVAL: 152 MS
EKG Q-T INTERVAL: 414 MS
EKG QRS DURATION: 84 MS
EKG QTC CALCULATION (BAZETT): 427 MS
EKG R AXIS: 68 DEGREES
EKG T AXIS: 77 DEGREES
EKG VENTRICULAR RATE: 64 BPM

## 2023-04-20 NOTE — ED NOTES
Pt ambulated x1 assist with steady gait. Pt states he has cane and walker at home.       Marybel Woodson RN  04/19/23 2002

## 2023-04-20 NOTE — ED NOTES
Discharge and education instructions reviewed. Patient verbalized understanding, teach-back successful. Patient denied questions at this time. No acute distress noted. Patient instructed to follow-up as noted - return to emergency department if symptoms worsen. Patient verbalized understanding. Discharged per EDMD with discharge instructions.         Dayron Pena RN  04/19/23 2055

## (undated) DEVICE — SNARE ENDOSCP L240CM LOOP W13MM DIA2.4MM SHT THROW SM OVL